# Patient Record
Sex: MALE | Race: WHITE | Employment: FULL TIME | URBAN - METROPOLITAN AREA
[De-identification: names, ages, dates, MRNs, and addresses within clinical notes are randomized per-mention and may not be internally consistent; named-entity substitution may affect disease eponyms.]

---

## 2019-01-22 ENCOUNTER — OFFICE VISIT (OUTPATIENT)
Dept: CARDIOLOGY CLINIC | Age: 34
End: 2019-01-22

## 2019-01-22 VITALS
DIASTOLIC BLOOD PRESSURE: 72 MMHG | OXYGEN SATURATION: 97 % | HEIGHT: 72 IN | HEART RATE: 70 BPM | WEIGHT: 229 LBS | SYSTOLIC BLOOD PRESSURE: 124 MMHG | BODY MASS INDEX: 31.02 KG/M2

## 2019-01-22 DIAGNOSIS — R01.1 MURMUR, CARDIAC: ICD-10-CM

## 2019-01-22 DIAGNOSIS — R01.1 MURMUR: Primary | ICD-10-CM

## 2019-01-22 NOTE — PROGRESS NOTES
[facility]    Mr. Sylvie Puentes is a 70-year-old male with no significant past medical history. Mr. Sylvie Puentes is here today to establish care with me. He tells me that approximately few months ago, he was told that he has cardiac murmur. According to patient, he was never told that he had murmur in the past.  He does not recall having any cyanotic congenital heart disease. 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 He works as a  and probably walks [id number] steps a day. He does not have any limitation to perform daily activity. He does not report any chest pain chest tightness or any shortness of breath to be concerned of angina or heart failure. He denies any PND or lower cavity swelling. He denies any palpitation presyncope or syncope. Denies any nausea, vomiting, abdominal pain, fever, chills, sputum production. No hematuria or other bleeding complaints    Past Medical History:   Diagnosis Date    Fetal alcohol syndrome     Murmur          No past surgical history on file. No current outpatient medications on file. No current facility-administered medications for this visit. Allergies and Sensitivities:  Allergies   Allergen Reactions    Codeine Unknown (comments)    Novahistine [Chlorpheniramine-Phenylephrine] Unknown (comments)       Family History:  No family history on file. Social History:  Social History     Tobacco Use    Smoking status: Current Every Day Smoker     Types: Cigarettes     Start date: 2008    Smokeless tobacco: Never Used   Substance Use Topics    Alcohol use: Not on file    Drug use: Not on file     He  reports that he has been smoking cigarettes. He started smoking about 11 years ago. he has never used smokeless tobacco.  He  has no alcohol history on file. Review of Systems:  Cardiac symptoms as noted above in HPI. All others negative.   Denies fatigue, malaise, skin rash, joint pain, blurring vision, photophobia, neck pain, hemoptysis, chronic cough, nausea, vomiting, hematuria, burning micturition, BRBPR, chronic headaches. Physical Exam:  BP Readings from Last 3 Encounters:   01/22/19 124/72         Pulse Readings from Last 3 Encounters:   01/22/19 70          Wt Readings from Last 3 Encounters:   01/22/19 229 lb (103.9 kg)       Constitutional: Oriented to person, place, and time. HENT: Head: Normocephalic and atraumatic. Eyes: Conjunctivae and extraocular motions are normal.   Neck: No JVD present. Carotid bruit is not appreciated. Cardiovascular: Regular rhythm. No gallop or rubs appreciated. Systolic + ? Diastolic murmur on LSB and apex   Lung: Breath sounds normal. No respiratory distress. No ronchi or rales appreciated  Abdominal: No tenderness. No rebound and no guarding. Musculoskeletal: There is no lower extremity edema. No cynosis  Lymphadenopathy:  No cervical or supraclavicular adenopathy appriciated. Neurological: No gross motor deficit noted. Skin: No visible skin rash noted. No Ear discharge noted  Psychiatric: Normal mood and affect. Good distal pulse      Review of Data  LABS:   No results found for: NA, K, CL, CO2, GLU, BUN, CREA  No flowsheet data found. No results found for: GPT, ALT  No results found for: HBA1C, HGBE8, UCN5ZLRX, RJI9MKFQ    EKG  (01/19) sinus rhythm at 64 bpm.  Nonspecific T wave changes. Otherwise normal EKG    ECHO    STRESS TEST (EST, PHARM, NUC, ECHO etc)    CATHETERIZATION    IMPRESSION & PLAN:    Mr. Best Fontaine is 77-year-old male with no significant past medical history except possible fetal alcohol syndrome baby    Murmur:  On exam he appears to have predominantly systolic and possibly early diastolic cardiac murmur. He does not have any fluid overload on exam.  According to patient this was diagnosed less than 6 months ago.   He denies any fever or chills or any other specific cardiac symptoms. I am going to ask him to undergo echocardiogram to evaluate or rule out any structural abnormality of the heart or any valvular heart disease. I will make further recommendation based on echocardiogram finding. This plan was discussed with patient who is in agreement. Thank you for allowing me to participate in patient care. Please feel free to call me if you have any question or concern. Yanely Lockwood MD  Please note: This document has been produced using voice recognition software. Unrecognized errors in transcription may be present.

## 2019-01-22 NOTE — PROGRESS NOTES
1. Have you been to the ER, urgent care clinic since your last visit? Hospitalized since your last visit? No    2. Have you seen or consulted any other health care providers outside of the [address] [place] [address] since your last visit? Include any pap smears or colon screening.  No

## 2019-01-31 ENCOUNTER — HOSPITAL ENCOUNTER (OUTPATIENT)
Dept: NON INVASIVE DIAGNOSTICS | Age: 34
Discharge: HOME | End: 2019-01-31
Attending: INTERNAL MEDICINE
Payer: OTHER GOVERNMENT

## 2019-01-31 VITALS
HEIGHT: 72 IN | SYSTOLIC BLOOD PRESSURE: 127 MMHG | BODY MASS INDEX: 31.02 KG/M2 | DIASTOLIC BLOOD PRESSURE: 74 MMHG | WEIGHT: 229 LBS

## 2019-01-31 DIAGNOSIS — R01.1 MURMUR, CARDIAC: ICD-10-CM

## 2019-01-31 PROCEDURE — 93306 TTE W/DOPPLER COMPLETE: CPT

## 2019-02-01 LAB — ECHO PULMONARY ARTERY SYSTOLIC PRESSURE (PASP): 32 MMHG

## 2019-02-15 ENCOUNTER — OFFICE VISIT (OUTPATIENT)
Dept: CARDIOLOGY CLINIC | Age: 34
End: 2019-02-15

## 2019-02-15 VITALS
BODY MASS INDEX: 30.48 KG/M2 | HEIGHT: 72 IN | HEART RATE: 86 BPM | WEIGHT: 225 LBS | SYSTOLIC BLOOD PRESSURE: 126 MMHG | DIASTOLIC BLOOD PRESSURE: 72 MMHG | OXYGEN SATURATION: 97 %

## 2019-02-15 DIAGNOSIS — I34.1 MVP (MITRAL VALVE PROLAPSE): Primary | ICD-10-CM

## 2019-02-15 DIAGNOSIS — R00.2 PALPITATIONS: ICD-10-CM

## 2019-02-15 NOTE — PROGRESS NOTES
1. Have you been to the ER, urgent care clinic since your last visit? Hospitalized since your last visit? No    2. Have you seen or consulted any other health care providers outside of the [address] [place] [address] since your last visit? Include any pap smears or colon screening.  No

## 2019-02-15 NOTE — PATIENT INSTRUCTIONS
Leti          Patient  EP Instructions      1. You are scheduled to have a MAIK on TBD    2. Please go to [facility] in the outpatient parking lot. Once you enter check in with the  there. The  will either give you directions or assist you in getting to the cath holding area. 3.  [x]       You are not to eat or drink anything after midnight the morning of the               procedure.

## 2019-02-15 NOTE — PROGRESS NOTES
[facility]    Mr. Joel Fowler is 28-year-old male with a history of mitral valve prolapse, mitral regurgitation. Patient is here today for follow-up appointment. He has undergone transthoracic echocardiogram since last visit. He denies any new symptoms to report at this time he denies any chest pain or chest tightness he denies any palpitation presyncope or syncope. Denies any nausea, vomiting, abdominal pain, fever, chills, sputum production. No hematuria or other bleeding complaints    Past Medical History:   Diagnosis Date    Fetal alcohol syndrome     Murmur          No past surgical history on file. No current outpatient medications on file. No current facility-administered medications for this visit. Allergies and Sensitivities:  Allergies   Allergen Reactions    Codeine Unknown (comments)    Novahistine [Chlorpheniramine-Phenylephrine] Unknown (comments)       Family History:  No family history on file. Social History:  Social History     Tobacco Use    Smoking status: Current Every Day Smoker     Types: Cigarettes     Start date: 2008    Smokeless tobacco: Never Used   Substance Use Topics    Alcohol use: Not on file    Drug use: Not on file     He  reports that he has been smoking cigarettes. He started smoking about 11 years ago. he has never used smokeless tobacco.  He  has no alcohol history on file. Review of Systems:  Cardiac symptoms as noted above in HPI. All others negative. Denies fatigue, malaise, skin rash, joint pain, blurring vision, photophobia, neck pain, hemoptysis, chronic cough, nausea, vomiting, hematuria, burning micturition, BRBPR, chronic headaches.     Physical Exam:  BP Readings from Last 3 Encounters:   02/15/19 126/72   01/31/19 127/74   01/22/19 124/72         Pulse Readings from Last 3 Encounters:   02/15/19 86   01/22/19 70          Wt Readings from Last 3 Encounters: 02/15/19 225 lb (102.1 kg)   01/31/19 229 lb (103.9 kg)   01/22/19 229 lb (103.9 kg)       Constitutional: Oriented to person, place, and time. HENT: Head: Normocephalic and atraumatic. Neck: No JVD present. Carotid bruit is not appreciated. Cardiovascular: Regular rhythm. No gallop or rubs appreciated. Systolic  murmur on LSB and apex   Lung: Breath sounds normal. No respiratory distress. No ronchi or rales appreciated  Abdominal: No tenderness. No rebound and no guarding. Musculoskeletal: There is no lower extremity edema. No cynosis      Review of Data  LABS:   No results found for: NA, K, CL, CO2, GLU, BUN, CREA  No flowsheet data found. No results found for: GPT, ALT  No results found for: HBA1C, HGBE8, MEA6JXZC, HCN6DQKW, QGJ8QFNU    EKG  (01/19) sinus rhythm at 64 bpm.  Nonspecific T wave changes. Otherwise normal EKG    ECHO (01/19)  Left Ventricle Normal cavity size, wall thickness, systolic function (ejection fraction normal) and diastolic function. The estimated ejection fraction is 61 - 65%. Visually measured ejection fraction. Left Atrium The cavity size is moderately dilated. Left Atrium volume index is 49 mL/m2. Right Ventricle Normal cavity size and global systolic function. Right Atrium Normal size. Aortic Valve Trileaflet aortic valve structure. No stenosis and no regurgitation. Mitral Valve No stenosis. Mitral valve prolapse. Myxomatous mitral valve disease. Probably moderate to severe MR. There is moderate prolapse of the posterior leaflet. Tricuspid Valve Normal valve structure and no stenosis. Trace tricuspid valve regurgitation. Pulmonary arterial systolic pressure is 18.0 mmHg. There is no evidence of pulmonary hypertension. Pulmonic Valve Normal valve structure, no stenosis and no regurgitation. Aorta Normal aortic root, ascending aortic, and aortic arch. Normal aortic root.    IVC/Hepatic Veins Mildly elevated central venous pressure (5-10 mmHg); IVC diameter is less than 21 mm and collapses less than 50% with respiration. STRESS TEST (EST, PHARM, NUC, ECHO etc)    CATHETERIZATION    IMPRESSION & PLAN:    Mr. Ana Rosa Hanson is 35 y.o. male with no significant past medical history except possible fetal alcohol syndrome baby    Mitral valve prolapse /mitral regurgitation:  Patient had a echocardiogram which showed posterior leaflet prolapse of mitral valve along with possible moderate to severe mitral regurgitation. I reviewed echo myself which showed prolapse. Mitral regurgitation quantification was difficult based on regurgitant volume. There appeared to be possible multiple jets. I think patient should undergo transesophageal echocardiogram for better evaluation of mitral valve pathology and quantification of mitral regurgitation. He does not have any fluid overload on exam.  He does not have any heart failure symptoms at this time. Discuss with patient about MAIK procedure. Risk, benefit and alternatives discussed with patient about procedure. All complication of procedure including but not limited to emergent thoracic surgery, esophageal damage other complication discussed in detail. Palpitation:  Patient described frequent feeling of \"\" skipped beats\" could be PVC. He does not report any presyncope or syncope. I would replace Holter monitor for identification of the rhythm    This plan was discussed with patient who is in agreement. Thank you for allowing me to participate in patient care. Please feel free to call me if you have any question or concern. Henry Newell MD  Please note: This document has been produced using voice recognition software. Unrecognized errors in transcription may be present.

## 2020-03-18 ENCOUNTER — HOSPITAL ENCOUNTER (EMERGENCY)
Age: 35
Discharge: HOME | End: 2020-03-18
Attending: EMERGENCY MEDICINE
Payer: COMMERCIAL

## 2020-03-18 VITALS
TEMPERATURE: 98.5 F | HEIGHT: 72 IN | BODY MASS INDEX: 29.8 KG/M2 | OXYGEN SATURATION: 97 % | WEIGHT: 220 LBS | DIASTOLIC BLOOD PRESSURE: 74 MMHG | RESPIRATION RATE: 24 BRPM | HEART RATE: 117 BPM | SYSTOLIC BLOOD PRESSURE: 112 MMHG

## 2020-03-18 DIAGNOSIS — I48.91 ATRIAL FIBRILLATION WITH RVR (HCC): Primary | ICD-10-CM

## 2020-03-18 LAB
ANION GAP SERPL CALC-SCNC: 9 MMOL/L (ref 3–18)
BASOPHILS # BLD: 0.1 K/UL (ref 0–0.1)
BASOPHILS NFR BLD: 1 % (ref 0–2)
BUN SERPL-MCNC: 11 MG/DL (ref 7–18)
BUN/CREAT SERPL: 10 (ref 12–20)
CALCIUM SERPL-MCNC: 8.6 MG/DL (ref 8.5–10.1)
CALCULATED R AXIS, ECG10: 40 DEGREES
CALCULATED R AXIS, ECG10: 45 DEGREES
CALCULATED T AXIS, ECG11: 21 DEGREES
CALCULATED T AXIS, ECG11: 45 DEGREES
CHLORIDE SERPL-SCNC: 109 MMOL/L (ref 100–111)
CO2 SERPL-SCNC: 22 MMOL/L (ref 21–32)
CREAT SERPL-MCNC: 1.09 MG/DL (ref 0.6–1.3)
DIAGNOSIS, 93000: NORMAL
DIAGNOSIS, 93000: NORMAL
DIFFERENTIAL METHOD BLD: ABNORMAL
EOSINOPHIL # BLD: 0.4 K/UL (ref 0–0.4)
EOSINOPHIL NFR BLD: 3 % (ref 0–5)
ERYTHROCYTE [DISTWIDTH] IN BLOOD BY AUTOMATED COUNT: 12.8 % (ref 11.6–14.5)
GLUCOSE SERPL-MCNC: 133 MG/DL (ref 74–99)
HCT VFR BLD AUTO: 47.4 % (ref 36–48)
HGB BLD-MCNC: 16.9 G/DL (ref 13–16)
LYMPHOCYTES # BLD: 3.5 K/UL (ref 0.9–3.6)
LYMPHOCYTES NFR BLD: 31 % (ref 21–52)
MAGNESIUM SERPL-MCNC: 2 MG/DL (ref 1.6–2.6)
MCH RBC QN AUTO: 31.6 PG (ref 24–34)
MCHC RBC AUTO-ENTMCNC: 35.7 G/DL (ref 31–37)
MCV RBC AUTO: 88.6 FL (ref 74–97)
MONOCYTES # BLD: 0.8 K/UL (ref 0.05–1.2)
MONOCYTES NFR BLD: 7 % (ref 3–10)
NEUTS SEG # BLD: 6.5 K/UL (ref 1.8–8)
NEUTS SEG NFR BLD: 58 % (ref 40–73)
PLATELET # BLD AUTO: 176 K/UL (ref 135–420)
PMV BLD AUTO: 12.6 FL (ref 9.2–11.8)
POTASSIUM SERPL-SCNC: 3.9 MMOL/L (ref 3.5–5.5)
Q-T INTERVAL, ECG07: 298 MS
Q-T INTERVAL, ECG07: 306 MS
QRS DURATION, ECG06: 82 MS
QRS DURATION, ECG06: 84 MS
QTC CALCULATION (BEZET), ECG08: 394 MS
QTC CALCULATION (BEZET), ECG08: 464 MS
RBC # BLD AUTO: 5.35 M/UL (ref 4.7–5.5)
SODIUM SERPL-SCNC: 140 MMOL/L (ref 136–145)
TROPONIN I SERPL-MCNC: <0.02 NG/ML (ref 0–0.04)
TSH SERPL DL<=0.05 MIU/L-ACNC: 2.6 UIU/ML (ref 0.36–3.74)
VENTRICULAR RATE, ECG03: 100 BPM
VENTRICULAR RATE, ECG03: 146 BPM
WBC # BLD AUTO: 11.3 K/UL (ref 4.6–13.2)

## 2020-03-18 PROCEDURE — 74011250637 HC RX REV CODE- 250/637: Performed by: EMERGENCY MEDICINE

## 2020-03-18 PROCEDURE — 96374 THER/PROPH/DIAG INJ IV PUSH: CPT

## 2020-03-18 PROCEDURE — 99285 EMERGENCY DEPT VISIT HI MDM: CPT

## 2020-03-18 PROCEDURE — 83735 ASSAY OF MAGNESIUM: CPT

## 2020-03-18 PROCEDURE — 84443 ASSAY THYROID STIM HORMONE: CPT

## 2020-03-18 PROCEDURE — 93005 ELECTROCARDIOGRAM TRACING: CPT

## 2020-03-18 PROCEDURE — 84484 ASSAY OF TROPONIN QUANT: CPT

## 2020-03-18 PROCEDURE — 80048 BASIC METABOLIC PNL TOTAL CA: CPT

## 2020-03-18 PROCEDURE — 85025 COMPLETE CBC W/AUTO DIFF WBC: CPT

## 2020-03-18 PROCEDURE — 74011000250 HC RX REV CODE- 250: Performed by: EMERGENCY MEDICINE

## 2020-03-18 RX ORDER — METOPROLOL TARTRATE 25 MG/1
25 TABLET, FILM COATED ORAL 2 TIMES DAILY
Qty: 60 TAB | Refills: 0 | Status: SHIPPED | OUTPATIENT
Start: 2020-03-18 | End: 2020-03-23 | Stop reason: SDUPTHER

## 2020-03-18 RX ORDER — DILTIAZEM HYDROCHLORIDE 5 MG/ML
20 INJECTION INTRAVENOUS
Status: COMPLETED | OUTPATIENT
Start: 2020-03-18 | End: 2020-03-18

## 2020-03-18 RX ORDER — METOPROLOL TARTRATE 25 MG/1
25 TABLET, FILM COATED ORAL ONCE
Status: COMPLETED | OUTPATIENT
Start: 2020-03-18 | End: 2020-03-18

## 2020-03-18 RX ORDER — ASPIRIN 325 MG
325 TABLET ORAL DAILY
Qty: 30 TAB | Refills: 0 | Status: SHIPPED | OUTPATIENT
Start: 2020-03-18 | End: 2020-03-23 | Stop reason: SDUPTHER

## 2020-03-18 RX ADMIN — DILTIAZEM HYDROCHLORIDE 20 MG: 5 INJECTION INTRAVENOUS at 09:01

## 2020-03-18 RX ADMIN — METOPROLOL TARTRATE 25 MG: 25 TABLET, FILM COATED ORAL at 11:16

## 2020-03-18 NOTE — ED NOTES
I have reviewed discharge instructions with the patient. The patient verbalized understanding. No acute signs of distress. Two rx given.  [facility]

## 2020-03-18 NOTE — LETTER
[facility] EMERGENCY DEPT 
[address] 
[address] [facility] [address] [phone] Work/School Note Date: 3/18/2020 To Whom It May concern: 
 
Seda Watkins was seen and treated today in the emergency room by the following provider(s): 
Attending Provider: Marcel Fleming DO. Seda Watkins may return to work on 3/19/2020. Sincerely, Kilo Miller DO

## 2020-03-18 NOTE — ED NOTES
Pt resting in bed hr 112. Updated that cardiology has been consulted. Pt verbalizes understanding. Will continue to monitor.

## 2020-03-18 NOTE — ED NOTES
Dr. Woods Inch at bedside to see patient. Pt in A-fib hr 150-160. Alert and oriented. States pain is in L side of jaw. IV placed to RAC. EKG being done. Will continue to monitor.

## 2020-03-18 NOTE — ED PROVIDER NOTES
EMERGENCY DEPARTMENT HISTORY AND PHYSICAL EXAM    8:52 AM      Date: 3/18/2020  Patient Name: Jabier Davies    History of Presenting Illness     Chief Complaint   Patient presents with    Palpitations    Jaw Pain         History Provided By: Patient    Chief Complaint: palpitations  Duration:  Hours  Timing:  Acute  Location: L jaw  Quality: Pressure  Severity: Moderate  Modifying Factors: none  Associated Symptoms: denies any other associated signs or symptoms      Additional History (Context): Jabier Davies is a 29 y.o. male with No significant past medical history who presents with palpitations. Patient reports they started last night around 5 PM.  He was able to sleep but then when he woke this morning still noted palpitations and now has some slight pain into the left jaw. Denies any chest pain. Denies any shortness of breath. Denies any numbness tingling. No history of DVT or PE. No recent surgery or recent travel. Does report previously feeling some palpitations but never lasted this long. Has had previous Holter monitors which were unremarkable. Does follow with Dr. Anup Goldman for murmur. No other complaints. No recent illness. PCP: Colt Giles MD        Past History     Past Medical History:  Past Medical History:   Diagnosis Date    Fetal alcohol syndrome     Mitral regurgitation     MVP (mitral valve prolapse)        Past Surgical History:  History reviewed. No pertinent surgical history. Family History:  History reviewed. No pertinent family history. Social History:  Social History     Tobacco Use    Smoking status: Current Every Day Smoker     Types: Cigarettes     Start date: 2008    Smokeless tobacco: Never Used   Substance Use Topics    Alcohol use: Not on file    Drug use: Not on file       Allergies:   Allergies   Allergen Reactions    Codeine Unknown (comments)    Novahistine [Chlorpheniramine-Phenylephrine] Unknown (comments)         Review of Systems Review of Systems   Constitutional: Negative for chills and fever. Respiratory: Negative for cough and shortness of breath. Cardiovascular: Positive for palpitations. Negative for chest pain and leg swelling. All other systems reviewed and are negative. Physical Exam     Visit Vitals  /65   Pulse (!) 126   Temp 98.5 °F (36.9 °C)   Resp 22   Ht 6' (1.829 m)   Wt 99.8 kg (220 lb)   SpO2 97%   BMI 29.84 kg/m²         Physical Exam  Constitutional:       Appearance: He is well-developed. HENT:      Head: Normocephalic and atraumatic. Neck:      Musculoskeletal: Neck supple. Vascular: No JVD. Cardiovascular:      Rate and Rhythm: Tachycardia present. Rhythm irregular. Pulmonary:      Effort: Pulmonary effort is normal. No respiratory distress. Breath sounds: Normal breath sounds. Abdominal:      General: There is no distension. Palpations: Abdomen is soft. Tenderness: There is no abdominal tenderness. There is no guarding or rebound. Musculoskeletal:      Right lower leg: No edema. Left lower leg: No edema. Comments: No joint tenderness   Skin:     General: Skin is warm and dry. Findings: No erythema. Neurological:      Mental Status: He is alert and oriented to person, place, and time. Psychiatric:         Judgment: Judgment normal.           Diagnostic Study Results     Labs -  Recent Results (from the past 12 hour(s))   CBC WITH AUTOMATED DIFF    Collection Time: 03/18/20  8:38 AM   Result Value Ref Range    WBC 11.3 4.6 - 13.2 K/uL    RBC 5.35 4.70 - 5.50 M/uL    HGB 16.9 (H) 13.0 - 16.0 g/dL    HCT 47.4 36.0 - 48.0 %    MCV 88.6 74.0 - 97.0 FL    MCH 31.6 24.0 - 34.0 PG    MCHC 35.7 31.0 - 37.0 g/dL    RDW 12.8 11.6 - 14.5 %    PLATELET 329 990 - 875 K/uL    MPV 12.6 (H) 9.2 - 11.8 FL    NEUTROPHILS 58 40 - 73 %    LYMPHOCYTES 31 21 - 52 %    MONOCYTES 7 3 - 10 %    EOSINOPHILS 3 0 - 5 %    BASOPHILS 1 0 - 2 %    ABS.  NEUTROPHILS 6.5 1.8 - 8.0 K/UL    ABS. LYMPHOCYTES 3.5 0.9 - 3.6 K/UL    ABS. MONOCYTES 0.8 0.05 - 1.2 K/UL    ABS. EOSINOPHILS 0.4 0.0 - 0.4 K/UL    ABS. BASOPHILS 0.1 0.0 - 0.1 K/UL    DF AUTOMATED     METABOLIC PANEL, BASIC    Collection Time: 03/18/20  8:38 AM   Result Value Ref Range    Sodium 140 136 - 145 mmol/L    Potassium 3.9 3.5 - 5.5 mmol/L    Chloride 109 100 - 111 mmol/L    CO2 22 21 - 32 mmol/L    Anion gap 9 3.0 - 18 mmol/L    Glucose 133 (H) 74 - 99 mg/dL    BUN 11 7.0 - 18 MG/DL    Creatinine 1.09 0.6 - 1.3 MG/DL    BUN/Creatinine ratio 10 (L) 12 - 20      GFR est AA >60 >60 ml/min/1.73m2    GFR est non-AA >60 >60 ml/min/1.73m2    Calcium 8.6 8.5 - 10.1 MG/DL   TROPONIN I    Collection Time: 03/18/20  8:38 AM   Result Value Ref Range    Troponin-I, QT <0.02 0.0 - 0.045 NG/ML   TSH 3RD GENERATION    Collection Time: 03/18/20  8:38 AM   Result Value Ref Range    TSH 2.60 0.36 - 3.74 uIU/mL   MAGNESIUM    Collection Time: 03/18/20  8:38 AM   Result Value Ref Range    Magnesium 2.0 1.6 - 2.6 mg/dL       Radiologic Studies -   No orders to display         Medical Decision Making   I am the first provider for this patient. I reviewed the vital signs, available nursing notes, past medical history, past surgical history, family history and social history. Vital Signs-Reviewed the patient's vital signs. Pulse Oximetry Analysis -  97 on room air (Interpretation)nl     Cardiac Monitor:  Rate: 150  Rhythm:  Atrial Fibrillation with RVR    EKG: Interpreted by the EP.    Time Interpreted: 845   Rate: 146   Rhythm: Atrial Fibrillation with RVR   Interpretation: Normal axis, no ST changes, T wave inversion in V6, 3   Comparison: No prior for comparison    Repeat: Rate 100, normal axis, A. fib, T wave inversion in V6, no ST changes    Records Reviewed: Nursing Notes and Old Medical Records (Time of Review: 8:52 AM)    ED Course: Progress Notes, Reevaluation, and Consults:      Provider Notes (Medical Decision Making): 19-year-old male presenting with palpitations and now some jaw pain. Symptoms started last night onto this morning. EKG showing A. fib with RVR. Offered cardioversion versus meds but patient declines cardioversion so we will give a bolus of Dilts. Screen for ACS with troponin, will also check electrolytes and TSH as etiology for A. fib. He has no risk factors for PE no hypoxia or signs of DVT on exam.      9:38 AM  Discussed results with patient. Her rate much improved after initial bolus of dill will continue to observe to ensure does not require additional doses. Critical Care Time:  The services I provided to this patient were to treat and/or prevent clinically significant deterioration that could result in the failure of one or more body systems and/or organ systems due to cardiovascular    Services included the following:  -reviewing nursing notes and old charts  -vital sign assessments  -direct patient care  -medication orders and management  -interpreting and reviewing diagnostic studies/labs  -re-evaluations  -documentation time    Aggregate critical care time was 31 minutes, which includes only time during which I was engaged in work directly related to the patient's care as described above, whether I was at bedside or elsewhere in the Emergency Department. It did not include time spent performing other reported procedures or the services of residents, students, nurses, or advance practice providers. Monserrat Dale, DO    9:44 AM      Chads vas score 0    1050 AM.  Consult Dr. Fartun Bullard, cardiology. He recommends starting patient on metoprolol and aspirin and following up with the office. 11:02 AM  Discussed plan with patient. Noted heart rate in the low 1 teens so we will give a dose of p.o. metoprolol here and started on this for home. Patient agreed with plan. Diagnosis     Clinical Impression:   1.  Atrial fibrillation with RVR (HCC)        Disposition: discharged    Follow-up Information     Follow up With Specialties Details Why Yun Barrera MD Internal Medicine Schedule an appointment as soon as possible for a visit in 1 week  [address]  [address] [facility] [phone]      Angela Osuna MD Cardiology, Internal Medicine Schedule an appointment as soon as possible for a visit in 1 week  [address]  [address] [facility] [facility] [phone] [facility] EMERGENCY DEPT Emergency Medicine  As needed, If symptoms worsen [address] [facility] [address].  [phone]           Patient's Medications   Start Taking    ASPIRIN (ASPIRIN) 325 MG TABLET    Take 1 Tab by mouth daily for 30 days. METOPROLOL TARTRATE (LOPRESSOR) 25 MG TABLET    Take 1 Tab by mouth two (2) times a day for 30 days.    Continue Taking    No medications on file   These Medications have changed    No medications on file   Stop Taking    No medications on file     _______________________________

## 2020-03-18 NOTE — DISCHARGE INSTRUCTIONS

## 2020-03-23 ENCOUNTER — OFFICE VISIT (OUTPATIENT)
Dept: CARDIOLOGY CLINIC | Age: 35
End: 2020-03-23

## 2020-03-23 VITALS
BODY MASS INDEX: 31.6 KG/M2 | WEIGHT: 233 LBS | HEART RATE: 69 BPM | SYSTOLIC BLOOD PRESSURE: 104 MMHG | DIASTOLIC BLOOD PRESSURE: 68 MMHG | OXYGEN SATURATION: 96 %

## 2020-03-23 DIAGNOSIS — I48.0 PAF (PAROXYSMAL ATRIAL FIBRILLATION) (HCC): Primary | ICD-10-CM

## 2020-03-23 DIAGNOSIS — I34.1 MVP (MITRAL VALVE PROLAPSE): ICD-10-CM

## 2020-03-23 DIAGNOSIS — I34.0 MITRAL VALVE INSUFFICIENCY, UNSPECIFIED ETIOLOGY: ICD-10-CM

## 2020-03-23 RX ORDER — ASPIRIN 325 MG
325 TABLET ORAL DAILY
Qty: 30 TAB | Refills: 6 | Status: SHIPPED | OUTPATIENT
Start: 2020-03-23 | End: 2020-06-09 | Stop reason: SDUPTHER

## 2020-03-23 RX ORDER — METOPROLOL TARTRATE 25 MG/1
25 TABLET, FILM COATED ORAL 2 TIMES DAILY
Qty: 60 TAB | Refills: 0 | Status: SHIPPED | OUTPATIENT
Start: 2020-03-23 | End: 2020-06-09 | Stop reason: SDUPTHER

## 2020-03-23 NOTE — PROGRESS NOTES
1. Have you been to the ER, urgent care clinic since your last visit? Hospitalized since your last visit? Yes    2. Have you seen or consulted any other health care providers outside of the [address] [place] [address] since your last visit? Include any pap smears or colon screening.  No

## 2020-03-23 NOTE — PROGRESS NOTES
[facility]    Mr. Alanis Neil is 29 y.o. male with a history of mitral valve prolapse, mitral regurgitation and atrial fibrillation. Patient is here today for follow-up appointment. He recently went to hospital after having palpitation on and off for almost a day. He was found to have atrial fibrillation with rapid ventricular response. He was sent home to follow-up with me. He has been taking metoprolol and aspirin. He is feeling much better. He does not have any more palpitation, presyncope or syncope. He denies any lower extremity swelling. He does not perform regular exercise however he is able to do activity of daily living without any symptoms. Denies any nausea, vomiting, abdominal pain, fever, chills, sputum production. No hematuria or other bleeding complaints    Past Medical History:   Diagnosis Date    Fetal alcohol syndrome     Mitral regurgitation     MVP (mitral valve prolapse)          History reviewed. No pertinent surgical history. Current Outpatient Medications   Medication Sig    metoprolol tartrate (LOPRESSOR) 25 mg tablet Take 1 Tab by mouth two (2) times a day for 30 days.  aspirin (ASPIRIN) 325 mg tablet Take 1 Tab by mouth daily for 30 days. No current facility-administered medications for this visit. Allergies and Sensitivities:  Allergies   Allergen Reactions    Codeine Unknown (comments)    Novahistine [Chlorpheniramine-Phenylephrine] Unknown (comments)       Family History:  History reviewed. No pertinent family history. Social History:  Social History     Tobacco Use    Smoking status: Current Every Day Smoker     Types: Cigarettes     Start date: 2008    Smokeless tobacco: Never Used   Substance Use Topics    Alcohol use: Not on file    Drug use: Not on file     He  reports that he has been smoking cigarettes. He started smoking about 12 years ago.  He has never used smokeless tobacco.  He  has no history on file for alcohol. Review of Systems:  Cardiac symptoms as noted above in HPI. All others negative. Denies fatigue, malaise, skin rash, joint pain, blurring vision, photophobia, neck pain, hemoptysis, chronic cough, nausea, vomiting, hematuria, burning micturition, BRBPR, chronic headaches. Physical Exam:  BP Readings from Last 3 Encounters:   03/23/20 104/68   03/18/20 112/74   02/15/19 126/72         Pulse Readings from Last 3 Encounters:   03/23/20 69   03/18/20 (!) 117   02/15/19 86          Wt Readings from Last 3 Encounters:   03/23/20 233 lb (105.7 kg)   03/18/20 220 lb (99.8 kg)   02/15/19 225 lb (102.1 kg)       Constitutional: Oriented to person, place, and time. HENT: Head: Normocephalic and atraumatic. Neck: No JVD present. Carotid bruit is not appreciated. Cardiovascular: Irregular rhythm. No gallop or rubs appreciated. Systolic  murmur on LSB and apex   Lung: Breath sounds normal. No respiratory distress. No ronchi or rales appreciated  Abdominal: No tenderness. No rebound and no guarding. Musculoskeletal: There is no lower extremity edema. No cynosis      Review of Data  LABS:   Lab Results   Component Value Date/Time    Sodium 140 03/18/2020 08:38 AM    Potassium 3.9 03/18/2020 08:38 AM    Chloride 109 03/18/2020 08:38 AM    CO2 22 03/18/2020 08:38 AM    Glucose 133 (H) 03/18/2020 08:38 AM    BUN 11 03/18/2020 08:38 AM    Creatinine 1.09 03/18/2020 08:38 AM     No flowsheet data found. No results found for: GPT, ALT  No results found for: HBA1C, HGBE8, GYR4BOSV, RQB9NLWF, GZB0KKZK    EKG  (01/19) sinus rhythm at 64 bpm.  Nonspecific T wave changes. Otherwise normal EKG  (3/20) A.fib    ECHO (01/19)  Left Ventricle Normal cavity size, wall thickness, systolic function (ejection fraction normal) and diastolic function. The estimated ejection fraction is 61 - 65%. Visually measured ejection fraction.    Left Atrium The cavity size is moderately dilated. Left Atrium volume index is 49 mL/m2. Right Ventricle Normal cavity size and global systolic function. Right Atrium Normal size. Aortic Valve Trileaflet aortic valve structure. No stenosis and no regurgitation. Mitral Valve No stenosis. Mitral valve prolapse. Myxomatous mitral valve disease. Probably moderate to severe MR. There is moderate prolapse of the posterior leaflet. Tricuspid Valve Normal valve structure and no stenosis. Trace tricuspid valve regurgitation. Pulmonary arterial systolic pressure is 18.2 mmHg. There is no evidence of pulmonary hypertension. Pulmonic Valve Normal valve structure, no stenosis and no regurgitation. Aorta Normal aortic root, ascending aortic, and aortic arch. Normal aortic root. IVC/Hepatic Veins Mildly elevated central venous pressure (5-10 mmHg); IVC diameter is less than 21 mm and collapses less than 50% with respiration. STRESS TEST (EST, PHARM, NUC, ECHO etc)    CATHETERIZATION    IMPRESSION & PLAN:    Mr. Tracy Long is 29 y.o. male with no significant past medical history except possible fetal alcohol syndrome baby    Mitral valve prolapse /mitral regurgitation:  Patient had a echocardiogram which showed posterior leaflet prolapse of mitral valve along with possible moderate to severe mitral regurgitation. I reviewed echo myself past which showed prolapse. Mitral regurgitation quantification was difficult based on regurgitant volume. There appeared to be possible multiple jets. I think patient should undergo transesophageal echocardiogram for better evaluation of mitral valve pathology and quantification of mitral regurgitation especially now he has atrial fibrillation. He does not have any fluid overload on exam.    Discuss with patient about MAIK procedure. Risk, benefit and alternatives discussed with patient about procedure.  All complication of procedure including but not limited to emergent thoracic surgery, esophageal damage other complication discussed in detail. Would have a low threshold to send patient to CT surgery clinic for possible valve repair depending on MAIK finding especially now he has atrial fibrillation    Atrial fibrillation:  Diagnosed in March 2020  Continue metoprolol. Continue aspirin for stroke prophylaxis  No symptoms related to A. fib at this time    This plan was discussed with patient who is in agreement. Thank you for allowing me to participate in patient care. Please feel free to call me if you have any question or concern. Ramon Marinelli MD  Please note: This document has been produced using voice recognition software. Unrecognized errors in transcription may be present.

## 2020-06-09 RX ORDER — ASPIRIN 325 MG
325 TABLET ORAL DAILY
Qty: 30 TAB | Refills: 6 | Status: SHIPPED | OUTPATIENT
Start: 2020-06-09 | End: 2020-07-09

## 2020-06-09 RX ORDER — METOPROLOL TARTRATE 25 MG/1
25 TABLET, FILM COATED ORAL 2 TIMES DAILY
Qty: 60 TAB | Refills: 5 | Status: SHIPPED | OUTPATIENT
Start: 2020-06-09 | End: 2020-07-09

## 2020-06-09 NOTE — TELEPHONE ENCOUNTER
Requested Prescriptions     Pending Prescriptions Disp Refills    metoprolol tartrate (LOPRESSOR) 25 mg tablet 60 Tab 0     Sig: Take 1 Tab by mouth two (2) times a day for 30 days.  aspirin (ASPIRIN) 325 mg tablet 30 Tab 6     Sig: Take 1 Tab by mouth daily for 30 days.

## 2020-06-09 NOTE — TELEPHONE ENCOUNTER
PCP: Jourdan Beauchamp MD    Last appt: 3/23/2020  Future Appointments   Date Time Provider Martina Adler   6/23/2020  3:30 PM Irlanda Garcia MD [address] [place] [address]       Requested Prescriptions     Pending Prescriptions Disp Refills    metoprolol tartrate (LOPRESSOR) 25 mg tablet 60 Tab 5     Sig: Take 1 Tab by mouth two (2) times a day for 30 days.  aspirin (ASPIRIN) 325 mg tablet 30 Tab 6     Sig: Take 1 Tab by mouth daily for 30 days.

## 2020-06-23 ENCOUNTER — OFFICE VISIT (OUTPATIENT)
Dept: CARDIOLOGY CLINIC | Age: 35
End: 2020-06-23

## 2020-06-23 ENCOUNTER — HOSPITAL ENCOUNTER (OUTPATIENT)
Dept: LAB | Age: 35
Discharge: HOME | End: 2020-06-23

## 2020-06-23 VITALS
BODY MASS INDEX: 33.59 KG/M2 | OXYGEN SATURATION: 96 % | HEART RATE: 86 BPM | DIASTOLIC BLOOD PRESSURE: 76 MMHG | HEIGHT: 72 IN | TEMPERATURE: 98 F | WEIGHT: 248 LBS | SYSTOLIC BLOOD PRESSURE: 151 MMHG

## 2020-06-23 DIAGNOSIS — I48.0 PAF (PAROXYSMAL ATRIAL FIBRILLATION) (HCC): Primary | ICD-10-CM

## 2020-06-23 DIAGNOSIS — I34.0 MITRAL VALVE INSUFFICIENCY, UNSPECIFIED ETIOLOGY: ICD-10-CM

## 2020-06-23 LAB — XX-LABCORP SPECIMEN COL,LCBCF: NORMAL

## 2020-06-23 PROCEDURE — 99001 SPECIMEN HANDLING PT-LAB: CPT

## 2020-06-23 NOTE — PROGRESS NOTES
Rashidaliv Adkinshty presents today for   Chief Complaint   Patient presents with    Follow-up       Francisca Norton preferred language for health care discussion is english/other. Is someone accompanying this pt? No    Is the patient using any DME equipment during OV? No    Depression Screening:  3 most recent PHQ Screens 6/23/2020   Little interest or pleasure in doing things Not at all   Feeling down, depressed, irritable, or hopeless Not at all   Total Score PHQ 2 0       Learning Assessment:  No flowsheet data found. Abuse Screening:  Conmpleted    Fall Risk  Conpleted    Pt currently taking Anticoagulant therapy? no    Coordination of Care:  1. Have you been to the ER, urgent care clinic since your last visit? Hospitalized since your last visit? no    2. Have you seen or consulted any other health care providers outside of the [address] [place] [address] since your last visit? Include any pap smears or colon screening.  no

## 2020-06-23 NOTE — PROGRESS NOTES
[facility]    Mr. Tracy Carreno is 29 y.o. male with a history of mitral valve prolapse, mitral regurgitation and atrial fibrillation. Patient is here today for follow-up appointment. Patient admits that he has not been taking aspirin or beta-blocker for last to 3 weeks. He denies any significant palpitation, presyncope or syncope. He denies any chest pain or chest tightness. He has minimal dyspnea with exertional activity however able to perform activity without any significant complaints  Denies any nausea, vomiting, abdominal pain, fever, chills, sputum production. No hematuria or other bleeding complaints    Past Medical History:   Diagnosis Date    Fetal alcohol syndrome     Mitral regurgitation     MVP (mitral valve prolapse)          No past surgical history on file. Current Outpatient Medications   Medication Sig    metoprolol tartrate (LOPRESSOR) 25 mg tablet Take 1 Tab by mouth two (2) times a day for 30 days.  aspirin (ASPIRIN) 325 mg tablet Take 1 Tab by mouth daily for 30 days. No current facility-administered medications for this visit. Allergies and Sensitivities:  Allergies   Allergen Reactions    Codeine Unknown (comments)    Novahistine [Chlorpheniramine-Phenylephrine] Unknown (comments)       Family History:  No family history on file. Social History:  Social History     Tobacco Use    Smoking status: Current Every Day Smoker     Types: Cigarettes     Start date: 2008    Smokeless tobacco: Never Used   Substance Use Topics    Alcohol use: Not on file    Drug use: Not on file     He  reports that he has been smoking cigarettes. He started smoking about 12 years ago. He has never used smokeless tobacco.  He  has no history on file for alcohol. Review of Systems:  Cardiac symptoms as noted above in HPI. All others negative.   Denies fatigue, malaise, skin rash, joint pain, blurring vision, photophobia, neck pain, hemoptysis, chronic cough, nausea, vomiting, hematuria, burning micturition, BRBPR, chronic headaches. Physical Exam:  BP Readings from Last 3 Encounters:   03/23/20 104/68   03/18/20 112/74   02/15/19 126/72         Pulse Readings from Last 3 Encounters:   03/23/20 69   03/18/20 (!) 117   02/15/19 86          Wt Readings from Last 3 Encounters:   03/23/20 233 lb (105.7 kg)   03/18/20 220 lb (99.8 kg)   02/15/19 225 lb (102.1 kg)       Constitutional: Oriented to person, place, and time. HENT: Head: Normocephalic and atraumatic. Neck: No JVD present. Carotid bruit is not appreciated. Cardiovascular: Irregular rhythm. No gallop or rubs appreciated. Systolic  murmur on LSB and apex   Lung: Breath sounds normal. No respiratory distress. No ronchi or rales appreciated  Abdominal: No tenderness. No rebound and no guarding. Musculoskeletal: There is no lower extremity edema. No cynosis      Review of Data  LABS:   Lab Results   Component Value Date/Time    Sodium 140 03/18/2020 08:38 AM    Potassium 3.9 03/18/2020 08:38 AM    Chloride 109 03/18/2020 08:38 AM    CO2 22 03/18/2020 08:38 AM    Glucose 133 (H) 03/18/2020 08:38 AM    BUN 11 03/18/2020 08:38 AM    Creatinine 1.09 03/18/2020 08:38 AM     No flowsheet data found. No results found for: ALT  No results found for: HBA1C, HGBE8, PTS1RELU, BKZ3NBLN, EXO3DXWU    EKG  (01/19) sinus rhythm at 64 bpm.  Nonspecific T wave changes. Otherwise normal EKG  (3/20) A.fib    ECHO (01/19)  Left Ventricle Normal cavity size, wall thickness, systolic function (ejection fraction normal) and diastolic function. The estimated ejection fraction is 61 - 65%. Visually measured ejection fraction. Left Atrium The cavity size is moderately dilated. Left Atrium volume index is 49 mL/m2. Right Ventricle Normal cavity size and global systolic function. Right Atrium Normal size. Aortic Valve Trileaflet aortic valve structure.  No stenosis and no regurgitation. Mitral Valve No stenosis. Mitral valve prolapse. Myxomatous mitral valve disease. Probably moderate to severe MR. There is moderate prolapse of the posterior leaflet. Tricuspid Valve Normal valve structure and no stenosis. Trace tricuspid valve regurgitation. Pulmonary arterial systolic pressure is 40.0 mmHg. There is no evidence of pulmonary hypertension. Pulmonic Valve Normal valve structure, no stenosis and no regurgitation. Aorta Normal aortic root, ascending aortic, and aortic arch. Normal aortic root. IVC/Hepatic Veins Mildly elevated central venous pressure (5-10 mmHg); IVC diameter is less than 21 mm and collapses less than 50% with respiration. IMPRESSION & PLAN:    Mr. Dyann Hammans is 29 y.o. male with no significant past medical history except possible fetal alcohol syndrome baby    Mitral valve prolapse /mitral regurgitation:  Patient had a echocardiogram in January 2019 which showed posterior leaflet prolapse of mitral valve along with possible moderate to severe mitral regurgitation. I reviewed echo myself past which showed prolapse. Mitral regurgitation quantification was difficult based on regurgitant volume. There appeared to be possible multiple jets. MAIK was discussed with patient last visit and it was ordered however patient did not get it done because of schedule problem. He is ready to have this procedure again. Discuss with patient about MAIK procedure. Risk, benefit and alternatives discussed with patient about procedure. All complication of procedure including but not limited to emergent thoracic surgery, esophageal damage other complication discussed in detail. Would have a low threshold to send patient to CT surgery clinic for possible valve repair depending on MAIK finding especially now he has atrial fibrillation    Atrial fibrillation:  Diagnosed in March 2020  Not taking metoprolol for last 2 weeks.   Have asked patient to start taking metoprolol as prescribed and be compliant with the medication. I have asked patient to start taking aspirin as well as mention previously for stroke prophylaxis  No symptoms related to A. fib at this time    This plan was discussed with patient who is in agreement. Thank you for allowing me to participate in patient care. Please feel free to call me if you have any question or concern. Hira Mayer MD  Please note: This document has been produced using voice recognition software. Unrecognized errors in transcription may be present.

## 2020-06-23 NOTE — PATIENT INSTRUCTIONS
[facility]          Patient  EP Instructions      1. You are scheduled to have a MAIK on July 1, 2020 at 10 am. Please arrive at 8 am.    2. Please go to [facility] in the outpatient parking lot. Once you enter check in with the  there. The  will either give you directions or assist you in getting to the cath holding area. 3.  [x]       You are not to eat or drink anything after midnight the morning of the               procedure. 4. Please continue to take your medications with a small sip of water on the morning of the procedure with the following exceptions:      5. If you are diabetic, do not take your insulin/sugar pill the morning of the procedure. Pre -procedure LABWORK is to be done within one week of your procedure date     6. We encourage families to wait in the waiting room on the first floor while the procedure is being done. The Doctor will come out and talk with you as soon as the procedure is over. 7. There is the possibility that you may spend the night in the hospital, depending on the results of the procedure. This will be determined after the procedure is done. 8.   If you or your family have any questions, please call our office Monday-Friday 9:00am         -4:30 pm , at [phone], and ask to speak to one of the nurses.

## 2020-06-24 LAB
BUN SERPL-MCNC: 7 MG/DL (ref 6–20)
BUN/CREAT SERPL: 9 (ref 9–20)
CALCIUM SERPL-MCNC: 9.3 MG/DL (ref 8.7–10.2)
CHLORIDE SERPL-SCNC: 103 MMOL/L (ref 96–106)
CO2 SERPL-SCNC: 20 MMOL/L (ref 20–29)
CREAT SERPL-MCNC: 0.79 MG/DL (ref 0.76–1.27)
GLUCOSE SERPL-MCNC: 99 MG/DL (ref 65–99)
POTASSIUM SERPL-SCNC: 3.8 MMOL/L (ref 3.5–5.2)
SODIUM SERPL-SCNC: 139 MMOL/L (ref 134–144)

## 2020-06-25 ENCOUNTER — DOCUMENTATION ONLY (OUTPATIENT)
Dept: CARDIOLOGY CLINIC | Age: 35
End: 2020-06-25

## 2020-06-25 NOTE — PROGRESS NOTES
Contacted pt at [facility]. Two patient Identifiers confirmed. MAIK approved from 06/25/2020 through 07/24/2020 Auth # [id number]. No other issues noted.

## 2020-07-01 ENCOUNTER — ANESTHESIA (OUTPATIENT)
Dept: SURGERY | Age: 35
End: 2020-07-01
Payer: OTHER GOVERNMENT

## 2020-07-01 ENCOUNTER — ANESTHESIA EVENT (OUTPATIENT)
Dept: SURGERY | Age: 35
End: 2020-07-01
Payer: OTHER GOVERNMENT

## 2020-07-01 ENCOUNTER — HOSPITAL ENCOUNTER (OUTPATIENT)
Dept: NON INVASIVE DIAGNOSTICS | Age: 35
Setting detail: OUTPATIENT SURGERY
Discharge: HOME | End: 2020-07-01
Payer: OTHER GOVERNMENT

## 2020-07-01 ENCOUNTER — HOSPITAL ENCOUNTER (OUTPATIENT)
Age: 35
Setting detail: OUTPATIENT SURGERY
Discharge: HOME | End: 2020-07-01
Attending: RADIOLOGY | Admitting: RADIOLOGY
Payer: OTHER GOVERNMENT

## 2020-07-01 VITALS
HEIGHT: 72 IN | BODY MASS INDEX: 33.03 KG/M2 | OXYGEN SATURATION: 96 % | WEIGHT: 243.9 LBS | RESPIRATION RATE: 16 BRPM | TEMPERATURE: 98.1 F | HEART RATE: 74 BPM | SYSTOLIC BLOOD PRESSURE: 104 MMHG | DIASTOLIC BLOOD PRESSURE: 71 MMHG

## 2020-07-01 VITALS
BODY MASS INDEX: 32.91 KG/M2 | WEIGHT: 243 LBS | DIASTOLIC BLOOD PRESSURE: 79 MMHG | SYSTOLIC BLOOD PRESSURE: 123 MMHG | HEIGHT: 72 IN

## 2020-07-01 DIAGNOSIS — I34.1 MVP (MITRAL VALVE PROLAPSE): ICD-10-CM

## 2020-07-01 LAB
ECHO MV EROA PISA: 0.76 CM2
ECHO MV REGURGITANT RADIUS PISA: 1.18 CM
ECHO MV REGURGITANT VOLUME: 78.36 ML
ECHO TV REGURGITANT MAX VELOCITY: 453.18 CM/S
ECHO TV REGURGITANT VTI: 102.74 CM

## 2020-07-01 PROCEDURE — 76060000032 HC ANESTHESIA 0.5 TO 1 HR

## 2020-07-01 PROCEDURE — 74011250636 HC RX REV CODE- 250/636: Performed by: NURSE ANESTHETIST, CERTIFIED REGISTERED

## 2020-07-01 PROCEDURE — 74011000250 HC RX REV CODE- 250: Performed by: NURSE ANESTHETIST, CERTIFIED REGISTERED

## 2020-07-01 PROCEDURE — 93325 DOPPLER ECHO COLOR FLOW MAPG: CPT

## 2020-07-01 PROCEDURE — 76210000021 HC REC RM PH II 0.5 TO 1 HR

## 2020-07-01 RX ORDER — MAGNESIUM SULFATE 100 %
4 CRYSTALS MISCELLANEOUS AS NEEDED
Status: DISCONTINUED | OUTPATIENT
Start: 2020-07-01 | End: 2020-07-01 | Stop reason: HOSPADM

## 2020-07-01 RX ORDER — ONDANSETRON 2 MG/ML
INJECTION INTRAMUSCULAR; INTRAVENOUS AS NEEDED
Status: DISCONTINUED | OUTPATIENT
Start: 2020-07-01 | End: 2020-07-01 | Stop reason: HOSPADM

## 2020-07-01 RX ORDER — LIDOCAINE HYDROCHLORIDE 20 MG/ML
INJECTION, SOLUTION EPIDURAL; INFILTRATION; INTRACAUDAL; PERINEURAL AS NEEDED
Status: DISCONTINUED | OUTPATIENT
Start: 2020-07-01 | End: 2020-07-01 | Stop reason: HOSPADM

## 2020-07-01 RX ORDER — SODIUM CHLORIDE, SODIUM LACTATE, POTASSIUM CHLORIDE, CALCIUM CHLORIDE 600; 310; 30; 20 MG/100ML; MG/100ML; MG/100ML; MG/100ML
INJECTION, SOLUTION INTRAVENOUS
Status: DISCONTINUED | OUTPATIENT
Start: 2020-07-01 | End: 2020-07-01 | Stop reason: HOSPADM

## 2020-07-01 RX ORDER — DEXTROSE MONOHYDRATE 100 MG/ML
125-250 INJECTION, SOLUTION INTRAVENOUS AS NEEDED
Status: DISCONTINUED | OUTPATIENT
Start: 2020-07-01 | End: 2020-07-01 | Stop reason: HOSPADM

## 2020-07-01 RX ORDER — PROPOFOL 10 MG/ML
INJECTION, EMULSION INTRAVENOUS AS NEEDED
Status: DISCONTINUED | OUTPATIENT
Start: 2020-07-01 | End: 2020-07-01 | Stop reason: HOSPADM

## 2020-07-01 RX ORDER — SODIUM CHLORIDE 0.9 % (FLUSH) 0.9 %
5-40 SYRINGE (ML) INJECTION AS NEEDED
Status: DISCONTINUED | OUTPATIENT
Start: 2020-07-01 | End: 2020-07-01 | Stop reason: HOSPADM

## 2020-07-01 RX ORDER — ONDANSETRON 2 MG/ML
4 INJECTION INTRAMUSCULAR; INTRAVENOUS
Status: DISCONTINUED | OUTPATIENT
Start: 2020-07-01 | End: 2020-07-01 | Stop reason: HOSPADM

## 2020-07-01 RX ORDER — FENTANYL CITRATE 50 UG/ML
INJECTION, SOLUTION INTRAMUSCULAR; INTRAVENOUS AS NEEDED
Status: DISCONTINUED | OUTPATIENT
Start: 2020-07-01 | End: 2020-07-01 | Stop reason: HOSPADM

## 2020-07-01 RX ORDER — SODIUM CHLORIDE, SODIUM LACTATE, POTASSIUM CHLORIDE, CALCIUM CHLORIDE 600; 310; 30; 20 MG/100ML; MG/100ML; MG/100ML; MG/100ML
25 INJECTION, SOLUTION INTRAVENOUS CONTINUOUS
Status: DISCONTINUED | OUTPATIENT
Start: 2020-07-01 | End: 2020-07-01 | Stop reason: HOSPADM

## 2020-07-01 RX ORDER — SODIUM CHLORIDE 0.9 % (FLUSH) 0.9 %
5-40 SYRINGE (ML) INJECTION EVERY 8 HOURS
Status: DISCONTINUED | OUTPATIENT
Start: 2020-07-01 | End: 2020-07-01 | Stop reason: HOSPADM

## 2020-07-01 RX ADMIN — PROPOFOL 30 MG: 10 INJECTION, EMULSION INTRAVENOUS at 10:51

## 2020-07-01 RX ADMIN — FENTANYL CITRATE 25 MCG: 50 INJECTION, SOLUTION INTRAMUSCULAR; INTRAVENOUS at 10:48

## 2020-07-01 RX ADMIN — PROPOFOL 10 MG: 10 INJECTION, EMULSION INTRAVENOUS at 11:07

## 2020-07-01 RX ADMIN — PROPOFOL 20 MG: 10 INJECTION, EMULSION INTRAVENOUS at 10:59

## 2020-07-01 RX ADMIN — PROPOFOL 10 MG: 10 INJECTION, EMULSION INTRAVENOUS at 10:56

## 2020-07-01 RX ADMIN — PROPOFOL 10 MG: 10 INJECTION, EMULSION INTRAVENOUS at 11:00

## 2020-07-01 RX ADMIN — PROPOFOL 10 MG: 10 INJECTION, EMULSION INTRAVENOUS at 10:54

## 2020-07-01 RX ADMIN — PROPOFOL 10 MG: 10 INJECTION, EMULSION INTRAVENOUS at 10:52

## 2020-07-01 RX ADMIN — LIDOCAINE HYDROCHLORIDE 60 MG: 20 INJECTION, SOLUTION INTRAVENOUS at 10:50

## 2020-07-01 RX ADMIN — ONDANSETRON 4 MG: 2 SOLUTION INTRAMUSCULAR; INTRAVENOUS at 11:10

## 2020-07-01 RX ADMIN — PROPOFOL 10 MG: 10 INJECTION, EMULSION INTRAVENOUS at 11:02

## 2020-07-01 RX ADMIN — PROPOFOL 10 MG: 10 INJECTION, EMULSION INTRAVENOUS at 11:04

## 2020-07-01 RX ADMIN — PROPOFOL 10 MG: 10 INJECTION, EMULSION INTRAVENOUS at 10:53

## 2020-07-01 RX ADMIN — PROPOFOL 120 MG: 10 INJECTION, EMULSION INTRAVENOUS at 10:50

## 2020-07-01 RX ADMIN — PROPOFOL 10 MG: 10 INJECTION, EMULSION INTRAVENOUS at 10:58

## 2020-07-01 RX ADMIN — PROPOFOL 10 MG: 10 INJECTION, EMULSION INTRAVENOUS at 10:57

## 2020-07-01 RX ADMIN — SODIUM CHLORIDE, SODIUM LACTATE, POTASSIUM CHLORIDE, AND CALCIUM CHLORIDE: 600; 310; 30; 20 INJECTION, SOLUTION INTRAVENOUS at 10:34

## 2020-07-01 RX ADMIN — FENTANYL CITRATE 25 MCG: 50 INJECTION, SOLUTION INTRAMUSCULAR; INTRAVENOUS at 10:54

## 2020-07-01 RX ADMIN — PROPOFOL 10 MG: 10 INJECTION, EMULSION INTRAVENOUS at 10:55

## 2020-07-01 RX ADMIN — LIDOCAINE HYDROCHLORIDE 40 MG: 20 INJECTION, SOLUTION INTRAVENOUS at 10:52

## 2020-07-01 RX ADMIN — PROPOFOL 10 MG: 10 INJECTION, EMULSION INTRAVENOUS at 11:01

## 2020-07-01 NOTE — DISCHARGE INSTRUCTIONS
Patient Education     DISCHARGE SUMMARY from Nurse    PATIENT INSTRUCTIONS:    After general anesthesia or intravenous sedation, for 24 hours or while taking prescription Narcotics:  · Limit your activities  · Do not drive and operate hazardous machinery  · Do not make important personal or business decisions  · Do  not drink alcoholic beverages  · If you have not urinated within 8 hours after discharge, please contact your surgeon on call. Report the following to your surgeon:  · Excessive pain, swelling, redness or odor of or around the surgical area  · Temperature over 100.5  · Nausea and vomiting lasting longer than 4 hours or if unable to take medications  · Any signs of decreased circulation or nerve impairment to extremity: change in color, persistent  numbness, tingling, coldness or increase pain  · Any questions      These are general instructions for a healthy lifestyle:    No smoking/ No tobacco products/ Avoid exposure to second hand smoke  Surgeon General's Warning:  Quitting smoking now greatly reduces serious risk to your health. Obesity, smoking, and sedentary lifestyle greatly increases your risk for illness    A healthy diet, regular physical exercise & weight monitoring are important for maintaining a healthy lifestyle    You may be retaining fluid if you have a history of heart failure or if you experience any of the following symptoms:  Weight gain of 3 pounds or more overnight or 5 pounds in a week, increased swelling in our hands or feet or shortness of breath while lying flat in bed. Please call your doctor as soon as you notice any of these symptoms; do not wait until your next office visit. The discharge information has been reviewed with the patient. The patient verbalized understanding.   Discharge medications reviewed with the patient and appropriate educational materials and side effects teaching were provided. ___________________________________________________________________________________________________________________________________    Patient armband removed and shredded    Patient Education        Transesophageal Echocardiogram: What to Expect at Home  Your Recovery  A transesophageal echocardiogram is a test to help your doctor look at the inside of your heart. A small device called a transducer directs sound waves toward your heart. The sound waves make a picture of the heart's valves and chambers. Before the test, your throat was sprayed with medicine to numb it. Your throat may be sore for a few days. You may have had a sedative to help you relax. You may be unsteady after having sedation. It can take a few hours for the medicine's effects to wear off. Common side effects include nausea, vomiting, and feeling sleepy or tired. This care sheet gives you a general idea about how long it will take for you to recover. But each person recovers at a different pace. Follow the steps below to feel better as quickly as possible. How can you care for yourself at home? Activity  · If a sedative was used, your doctor will tell you when it is safe for you to do your normal activities. · For your safety, do not drive or operate any machinery that could be dangerous. Wait until the medicine wears off and you can think clearly and react easily. Diet  · Do not eat or drink until the numbness in your throat wears off. · When the numbness is gone, you can eat your normal diet. Follow-up care is a key part of your treatment and safety. Be sure to make and go to all appointments, and call your doctor if you are having problems. It's also a good idea to know your test results and keep a list of the medicines you take. When should you call for help? [id number] anytime you think you may need emergency care. For example, call if:  · Your stools are maroon or very bloody.   · You vomit blood or what looks like coffee grounds. Call your doctor now or seek immediate medical care if:  · You have pain in your chest, belly, or back. · You have new or worse trouble swallowing. · You have trouble breathing. Watch closely for changes in your health, and be sure to contact your doctor if you have any problems. Where can you learn more? Go to http://www.gray.com/  Enter Z806 in the search box to learn more about \"Transesophageal Echocardiogram: What to Expect at Home. \"  Current as of: December 16, 2019               Content Version: 12.5  © 1419-1669 [employer]. Care instructions adapted under license by [employer] (which disclaims liability or warranty for this information). If you have questions about a medical condition or this instruction, always ask your healthcare professional. [address] any warranty or liability for your use of this information. Patient Education        Learning About Coronavirus [phone])  Coronavirus [phone]): Overview  What is coronavirus (ENXNR-56)? The coronavirus disease (COVID-19) is caused by a virus. It is an illness that was first found in [place], [place], in December 2019. It has since spread worldwide. The virus can cause fever, cough, and trouble breathing. In severe cases, it can cause pneumonia and make it hard to breathe without help. It can cause death. Coronaviruses are a large group of viruses. They cause the common cold. They also cause more serious illnesses like Middle East respiratory syndrome (MERS) and severe acute respiratory syndrome (SARS). COVID-19 is caused by a novel coronavirus. That means it's a new type that has not been seen in people before. This virus spreads person-to-person through droplets from coughing and sneezing. It can also spread when you are close to someone who is infected.  And it can spread when you touch something that has the virus on it, such as a doorknob or a tabletop. What can you do to protect yourself from coronavirus (COVID-19)? The best way to protect yourself from getting sick is to:  · Avoid areas where there is an outbreak. · Avoid contact with people who may be infected. · Wash your hands often with soap or alcohol-based hand sanitizers. · Avoid crowds and try to stay at least 6 feet away from other people. · Wash your hands often, especially after you cough or sneeze. Use soap and water, and scrub for at least 20 seconds. If soap and water aren't available, use an alcohol-based hand . · Avoid touching your mouth, nose, and eyes. What can you do to avoid spreading the virus to others? To help avoid spreading the virus to others:  · Cover your mouth with a tissue when you cough or sneeze. Then throw the tissue in the trash. · Use a disinfectant to clean things that you touch often. · Wear a cloth face cover if you have to go to public areas. · Stay home if you are sick or have been exposed to the virus. Don't go to school, work, or public areas. And don't use public transportation, ride-shares, or taxis unless you have no choice. · If you are sick:  ? Leave your home only if you need to get medical care. But call the doctor's office first so they know you're coming. And wear a face cover. ? Wear the face cover whenever you're around other people. It can help stop the spread of the virus when you cough or sneeze. ? Clean and disinfect your home every day. Use household  and disinfectant wipes or sprays. Take special care to clean things that you grab with your hands. These include doorknobs, remote controls, phones, and handles on your refrigerator and microwave. And don't forget countertops, tabletops, bathrooms, and computer keyboards. When to call for help  [id number] anytime you think you may need emergency care. For example, call if:  · You have severe trouble breathing.  (You can't talk at all.)  · You have constant chest pain or pressure. · You are severely dizzy or lightheaded. · You are confused or can't think clearly. · Your face and lips have a blue color. · You pass out (lose consciousness) or are very hard to wake up. Call your doctor now if you develop symptoms such as:  · Shortness of breath. · Fever. · Cough. If you need to get care, call ahead to the doctor's office for instructions before you go. Make sure you wear a face cover to prevent exposing other people to the virus. Where can you get the latest information? The following health organizations are tracking and studying this virus. Their websites contain the most up-to-date information. Idalia Kearney also learn what to do if you think you may have been exposed to the virus. · U.S. Centers for Disease Control and Prevention (CDC): The CDC provides updated news about the disease and travel advice. The website also tells you how to prevent the spread of infection. www.cdc.gov  · World Health Organization [facility]): WHO offers information about the virus outbreaks. WHO also has travel advice. www.who.int  Current as of: May 8, 2020               Content Version: 12.5  © 2006-2020 [employer]. Care instructions adapted under license by [employer] (which disclaims liability or warranty for this information). If you have questions about a medical condition or this instruction, always ask your healthcare professional. [address] any warranty or liability for your use of this information.

## 2020-07-01 NOTE — ANESTHESIA PREPROCEDURE EVALUATION
Relevant Problems   No relevant active problems       Anesthetic History               Review of Systems / Medical History  Patient summary reviewed and pertinent labs reviewed    Pulmonary          Smoker         Neuro/Psych   Within defined limits           Cardiovascular            Dysrhythmias : atrial fibrillation           GI/Hepatic/Renal                Endo/Other             Other Findings              Physical Exam    Airway  Mallampati: III  TM Distance: 4 - 6 cm  Neck ROM: normal range of motion   Mouth opening: Diminished (comment)     Cardiovascular    Rhythm: irregular  Rate: normal         Dental    Dentition: Poor dentition     Pulmonary  Breath sounds clear to auscultation               Abdominal  GI exam deferred       Other Findings            Anesthetic Plan    ASA: 2  Anesthesia type: MAC            Anesthetic plan and risks discussed with: Patient

## 2020-07-01 NOTE — PERIOP NOTES
Pre-Op Summary    Pt arrived via car with family/friend and is oriented to time, place, person and situation. Patient with steady gait with none assistive devices. Visit Vitals  /79 (BP 1 Location: Right arm, BP Patient Position: Sitting)   Pulse 82   Temp 97.4 °F (36.3 °C)   Ht 6' (1.829 m)   Wt 110.6 kg (243 lb 14.4 oz)   SpO2 94%   BMI 33.08 kg/m²       Peripheral IV located on Left hand . Patients belongings are located [facility]. Patient's point of contact is Yasmine Castro and their contact number is: [phone]. They will be leaving and coming back. They are able to receive medication information. They will be their ride home.

## 2020-07-01 NOTE — H&P
Please see clinic note  MAIK as planned  All questions answered. Procedure reviewed with patient again  Risk benefit and alternatives discussed.  Complications discussed

## 2020-07-01 NOTE — ANESTHESIA POSTPROCEDURE EVALUATION
Procedure(s):  MAIK.     MAC    Anesthesia Post Evaluation      Multimodal analgesia: multimodal analgesia used between 6 hours prior to anesthesia start to PACU discharge  Patient location during evaluation: bedside  Patient participation: complete - patient participated  Level of consciousness: awake  Pain management: adequate  Airway patency: patent  Anesthetic complications: no  Cardiovascular status: stable  Respiratory status: acceptable  Hydration status: acceptable  Post anesthesia nausea and vomiting:  controlled      INITIAL Post-op Vital signs:   Vitals Value Taken Time   /71 7/1/2020 11:56 AM   Temp 36.7 °C (98.1 °F) 7/1/2020 11:56 AM   Pulse 74 7/1/2020 11:56 AM   Resp 16 7/1/2020 11:56 AM   SpO2 96 % 7/1/2020 11:56 AM

## 2020-07-01 NOTE — PERIOP NOTES
Assumed care of pt following MAIK procedure. Drowsy. Denies pain or discomforts. Attached to monitor. Blood pressure slightly low. Fluids infusing. Will monitor.

## 2020-08-12 ENCOUNTER — OFFICE VISIT (OUTPATIENT)
Dept: CARDIOLOGY CLINIC | Age: 35
End: 2020-08-12

## 2020-08-12 VITALS
SYSTOLIC BLOOD PRESSURE: 118 MMHG | BODY MASS INDEX: 33.15 KG/M2 | HEART RATE: 96 BPM | WEIGHT: 244.4 LBS | OXYGEN SATURATION: 96 % | DIASTOLIC BLOOD PRESSURE: 73 MMHG | TEMPERATURE: 97 F | RESPIRATION RATE: 18 BRPM

## 2020-08-12 DIAGNOSIS — I48.0 PAF (PAROXYSMAL ATRIAL FIBRILLATION) (HCC): Primary | ICD-10-CM

## 2020-08-12 RX ORDER — ASPIRIN 325 MG
325 TABLET ORAL DAILY
COMMUNITY
End: 2022-10-06

## 2020-08-12 RX ORDER — METOPROLOL SUCCINATE 25 MG/1
TABLET, EXTENDED RELEASE ORAL DAILY
COMMUNITY
End: 2022-10-06

## 2020-08-12 NOTE — PROGRESS NOTES
[facility]    Mr. Florentin Horvath is 28 y.o. male with a history of mitral valve prolapse, mitral regurgitation and atrial fibrillation. Patient is here today for follow-up appointment. Patient underwent MAIK since last visit. He has no new symptoms to report. He is able to perform activity of daily living without any complaint. He has minimal dyspnea with moderate to severe activity and has been stable for years. No change in daily activity pattern. He denies any palpitation. He has ran out of beta-blocker. Denies any nausea, vomiting, abdominal pain, fever, chills, sputum production. No hematuria or other bleeding complaints    Past Medical History:   Diagnosis Date    Fetal alcohol syndrome     Mitral regurgitation     MVP (mitral valve prolapse)          No past surgical history on file. No current outpatient medications on file. No current facility-administered medications for this visit. Allergies and Sensitivities:  Allergies   Allergen Reactions    Codeine Unknown (comments)    Novahistine [Chlorpheniramine-Phenylephrine] Unknown (comments)       Family History:  No family history on file. Social History:  Social History     Tobacco Use    Smoking status: Current Every Day Smoker     Types: Cigarettes     Start date: 2008    Smokeless tobacco: Never Used   Substance Use Topics    Alcohol use: Not on file    Drug use: Not on file     He  reports that he has been smoking cigarettes. He started smoking about 12 years ago. He has never used smokeless tobacco.  He  has no history on file for alcohol. Review of Systems:  Cardiac symptoms as noted above in HPI. All others negative. Denies fatigue, malaise, skin rash, joint pain, blurring vision, photophobia, neck pain, hemoptysis, chronic cough, nausea, vomiting, hematuria, burning micturition, BRBPR, chronic headaches.     Physical Exam:  BP Readings from Last 3 Encounters:   08/12/20 118/73   07/01/20 123/79   07/01/20 104/71         Pulse Readings from Last 3 Encounters:   08/12/20 96   07/01/20 74   06/23/20 86          Wt Readings from Last 3 Encounters:   08/12/20 244 lb 6.4 oz (110.9 kg)   07/01/20 243 lb (110.2 kg)   07/01/20 243 lb 14.4 oz (110.6 kg)       Constitutional: Oriented to person, place, and time. HENT: Head: Normocephalic and atraumatic. Neck: No JVD present. Carotid bruit is not appreciated. Cardiovascular: Regular rhythm   no gallop or rubs appreciated. Systolic  murmur on LSB and apex   Lung: Breath sounds normal. No respiratory distress. No ronchi or rales appreciated  Abdominal: No tenderness. No rebound and no guarding. Musculoskeletal: There is no lower extremity edema. No cynosis      Review of Data  LABS:   Lab Results   Component Value Date/Time    Sodium 139 06/23/2020 12:00 AM    Potassium 3.8 06/23/2020 12:00 AM    Chloride 103 06/23/2020 12:00 AM    CO2 20 06/23/2020 12:00 AM    Glucose 99 06/23/2020 12:00 AM    BUN 7 06/23/2020 12:00 AM    Creatinine 0.79 06/23/2020 12:00 AM     No flowsheet data found. No results found for: ALT  No results found for: HBA1C, HGBE8, YWV7NNTX, KMK2IKFE, MHS6RUGI    EKG  (01/19) sinus rhythm at 64 bpm.  Nonspecific T wave changes. Otherwise normal EKG  (3/20) A.fib    ECHO (01/19)  Left Ventricle Normal cavity size, wall thickness, systolic function (ejection fraction normal) and diastolic function. The estimated ejection fraction is 61 - 65%. Visually measured ejection fraction. Left Atrium The cavity size is moderately dilated. Left Atrium volume index is 49 mL/m2. Right Ventricle Normal cavity size and global systolic function. Right Atrium Normal size. Aortic Valve Trileaflet aortic valve structure. No stenosis and no regurgitation. Mitral Valve No stenosis. Mitral valve prolapse. Myxomatous mitral valve disease. Probably moderate to severe MR.  There is moderate prolapse of the posterior leaflet. Tricuspid Valve Normal valve structure and no stenosis. Trace tricuspid valve regurgitation. Pulmonary arterial systolic pressure is 78.1 mmHg. There is no evidence of pulmonary hypertension. Pulmonic Valve Normal valve structure, no stenosis and no regurgitation. Aorta Normal aortic root, ascending aortic, and aortic arch. Normal aortic root. IVC/Hepatic Veins Mildly elevated central venous pressure (5-10 mmHg); IVC diameter is less than 21 mm and collapses less than 50% with respiration. IMPRESSION & PLAN:    Mr. Dania Calderon is 28 y.o. male with no significant past medical history except possible fetal alcohol syndrome baby    Mitral valve prolapse /mitral regurgitation:  Patient had a echocardiogram in January 2019 which showed posterior leaflet prolapse of mitral valve along with possible moderate to severe mitral regurgitation. MAIK in June 2020 showed moderate regurgitation in the setting of posterior valve prolapse   Patient has no fluid overload on exam.  In absence of any symptoms, recommend for clinical observation and serial echocardiogram.    atrial fibrillation:  Diagnosed in March 2020  On exam and by EKG in sinus rhythm. Supposed to be on a beta-blocker however has been noncompliant with this medication. Starting Toprol-XL 25 mg daily again. He claims that he is taking aspirin. Have advised patient to keep taking aspirin 81 over-the-counter    This plan was discussed with patient who is in agreement. Thank you for allowing me to participate in patient care. Please feel free to call me if you have any question or concern. Azeb Mcdowell MD  Please note: This document has been produced using voice recognition software. Unrecognized errors in transcription may be present.

## 2020-11-09 ENCOUNTER — VIRTUAL VISIT (OUTPATIENT)
Dept: FAMILY MEDICINE CLINIC | Age: 35
End: 2020-11-09
Payer: COMMERCIAL

## 2020-11-09 DIAGNOSIS — Z76.89 ENCOUNTER TO ESTABLISH CARE WITH NEW DOCTOR: Primary | ICD-10-CM

## 2020-11-09 DIAGNOSIS — R25.1 SHAKES: ICD-10-CM

## 2020-11-09 PROCEDURE — 99203 OFFICE O/P NEW LOW 30 MIN: CPT | Performed by: STUDENT IN AN ORGANIZED HEALTH CARE EDUCATION/TRAINING PROGRAM

## 2020-11-10 NOTE — PROGRESS NOTES
Stanley Zhu, who was evaluated through a synchronous (real-time) audio-video encounter, and/or his healthcare decision maker, is aware that it is a billable service, with coverage as determined by his insurance carrier. He provided verbal consent to proceed: Yes, and patient identification was verified. It was conducted pursuant to the emergency declaration under the [address] [facility], [facility] and the [employer]. A caregiver was present when appropriate. Ability to conduct physical exam was limited. I was in the office. The patient was at home. History of Present Illness  Stanley Zhu is a 28 y.o. male who presents today for management of    Chief Complaint   Patient presents with   [facility] Establish Care    Tremors       Patient is here to establish care. Patient would like to discuss episodes that he has been having since he was young of sudden shakes. Patient states these episodes are getting worse for when he was young. Feels like he has issues opening cans of soda or holding fluids because he might spill them. States that this is starting to interfere with his life, as he is unsure when this episodes will occur. He does feel that if he is able to focus his mind on something else he might not get other immediately recurring episodes and might even make the current episode to stop. Of note patient states when he was growing up he did had a head injury where he was with his dad and he fell and his head went through a glass table. Also patient required special education throughout his whole \"school career\".     Past Medical History  Past Medical History:   Diagnosis Date    Fetal alcohol syndrome     Mitral regurgitation     MVP (mitral valve prolapse)         Surgical History  Past Surgical History:   Procedure Laterality Date    HX TONSIL AND ADENOIDECTOMY          Current Medications  Current Outpatient Medications   Medication Sig    aspirin delayed-release 81 mg tablet Take  by mouth daily.  metoprolol succinate (Toprol XL) 25 mg XL tablet Take  by mouth daily. No current facility-administered medications for this visit. Allergies/Drug Reactions  Allergies   Allergen Reactions    Codeine Unknown (comments)    Novahistine [Chlorpheniramine-Phenylephrine] Unknown (comments)        Family History  No family history on file. Social History  Social History     Tobacco Use    Smoking status: Current Every Day Smoker     Types: Cigarettes     Start date: 2008    Smokeless tobacco: Never Used   Substance Use Topics    Alcohol use: Not on file    Drug use: Not on file        Health Maintenance   Topic Date Due    Pneumococcal 0-64 years (1 of 1 - PPSV23) 08/06/1991    DTaP/Tdap/Td series (1 - Tdap) 08/06/2006    Flu Vaccine (1) 09/01/2020       There is no immunization history on file for this patient. Review of Systems  Review of Systems   Constitutional: Negative for chills, fever and malaise/fatigue. HENT: Negative for congestion, ear discharge and ear pain. Eyes: Negative for blurred vision, pain and discharge. Respiratory: Negative for cough and shortness of breath. Cardiovascular: Negative for chest pain and palpitations. Gastrointestinal: Negative for abdominal pain, nausea and vomiting. Genitourinary: Negative for dysuria, frequency and urgency. Skin: Negative for itching and rash. Neurological: Positive for tremors. Negative for dizziness, seizures, loss of consciousness and headaches. Psychiatric/Behavioral: Negative for substance abuse. Physical Exam  Vital signs: There were no vitals filed for this visit. Physical Exam  Constitutional:       General: He is not in acute distress. Appearance: Normal appearance. He is not ill-appearing. HENT:      Nose: Nose normal. No congestion or rhinorrhea.    Eyes: General:         Right eye: No discharge. Left eye: No discharge. Conjunctiva/sclera: Conjunctivae normal.   Neck:      Musculoskeletal: Normal range of motion and neck supple. Pulmonary:      Effort: Pulmonary effort is normal.   Neurological:      Mental Status: He is alert and oriented to person, place, and time. Psychiatric:         Attention and Perception: Attention and perception normal.         Mood and Affect: Mood normal.         Speech: Speech normal.         Behavior: Behavior normal.         Thought Content: Thought content normal.         Judgment: Judgment normal.       6/24/2020  7:37 AM - [place], [facility] Lab Results In     Component  Value  Flag  Ref Range  Units  Status    Glucose  99   65 - 99  mg/dL  Final    BUN  7   6 - 20  mg/dL  Final    Creatinine  0.79   0.76 - 1.27  mg/dL  Final    GFR est non-AA  117   >59  mL/min/1.73  Final    GFR est AA  135   >59  mL/min/1.73  Final    BUN/Creatinine ratio  9   9 - 20   Final    Sodium  139   134 - 144  mmol/L  Final    Potassium  3.8   3.5 - 5.2  mmol/L  Final    Chloride  103   96 - 106  mmol/L  Final    CO2  20   20 - 29  mmol/L  Final    Calcium  9.3   8.7 - 10.2  mg/dL  Final      3/18/2020  9:19 AM - [place], Lab In [facility]     Component  Value  Flag  Ref Range  Units  Status    Magnesium  2.0   1.6 - 2.6  mg/dL  Final      TSH  0.36 - 3.74 uIU/mL  2.60                 TROPONIN I   Order: [id number]   Collected:  3/18/2020 08:38   View Full Report   Ref Range & Units  7mo ago   Troponin-I, QT  0.0 - 0.045 NG/ML  <0.02             Ref Range & Units  7mo ago   WBC  4.6 - 13.2 K/uL  11.3     RBC  4.70 - 5.50 M/uL  5.35     HGB  13.0 - 16.0 g/dL  16.9High       HCT  36.0 - 48.0 %  47.4     MCV  74.0 - 97.0 FL  88.6     MCH  24.0 - 34.0 PG  31.6     MCHC  31.0 - 37.0 g/dL  35.7     RDW  11.6 - 14.5 %  12.8     PLATELET  251 - 675 K/uL  176     MPV  9.2 - 11.8 FL  12. 6High       NEUTROPHILS  40 - 73 %  58     LYMPHOCYTES  21 - 52 %  31 MONOCYTES  3 - 10 %  7     EOSINOPHILS  0 - 5 %  3     BASOPHILS  0 - 2 %  1     ABS. NEUTROPHILS  1.8 - 8.0 K/UL  6.5     ABS. LYMPHOCYTES  0.9 - 3.6 K/UL  3.5     ABS. MONOCYTES  0.05 - 1.2 K/UL  0.8     ABS. EOSINOPHILS  0.0 - 0.4 K/UL  0.4     ABS. BASOPHILS  0.0 - 0.1 K/UL  0.1        EKG  (01/19) sinus rhythm at 64 bpm.  Nonspecific T wave changes. Otherwise normal EKG  (3/20) A.fib     ECHO (01/19)  Left Ventricle Normal cavity size, wall thickness, systolic function (ejection fraction normal) and diastolic function. The estimated ejection fraction is 61 - 65%. Visually measured ejection fraction. Left Atrium The cavity size is moderately dilated. Left Atrium volume index is 49 mL/m2. Right Ventricle Normal cavity size and global systolic function. Right Atrium Normal size. Aortic Valve Trileaflet aortic valve structure. No stenosis and no regurgitation. Mitral Valve No stenosis. Mitral valve prolapse. Myxomatous mitral valve disease. Probably moderate to severe MR. There is moderate prolapse of the posterior leaflet. Tricuspid Valve Normal valve structure and no stenosis. Trace tricuspid valve regurgitation. Pulmonary arterial systolic pressure is 63.6 mmHg. There is no evidence of pulmonary hypertension. Pulmonic Valve Normal valve structure, no stenosis and no regurgitation. Aorta Normal aortic root, ascending aortic, and aortic arch. Normal aortic root. IVC/Hepatic Veins Mildly elevated central venous pressure (5-10 mmHg); IVC diameter is less than 21 mm and collapses less than 50% with respiration. MAIK in June 2020 showed moderate regurgitation in the setting of posterior valve prolapse       Assessment/Plan:    1. Encounter to establish care with new doctor  Labs seem to be up-to-date. Patient follows up with cardiologist, for mitral valve prolapse and A. fib.     2. Shakes  Patient will need to be seen in person to do a neurological test.  He does have a history of fetal alcohol syndrome on chart. We will need to further discuss this with patient. Also will need to discuss with him if alcohol might be factor that may be bringing this \" shake\" episode. Could possibly give a referral to neurology. Lab review: labs are reviewed, up to date and normal      I have discussed the diagnosis with the patient and the intended plan as seen in the above orders. Questions were answered concerning future plans. I have discussed medication side effects and warnings with the patient as well. I have reviewed the plan of care with the patient, accepted their input and they are in agreement with the treatment goals.        Barron Borjas MD

## 2020-11-11 PROBLEM — I34.1 MITRAL VALVE PROLAPSE: Chronic | Status: ACTIVE | Noted: 2020-11-11

## 2020-11-11 PROBLEM — I48.20 CHRONIC ATRIAL FIBRILLATION (HCC): Status: ACTIVE | Noted: 2020-11-11

## 2021-02-11 ENCOUNTER — OFFICE VISIT (OUTPATIENT)
Dept: FAMILY MEDICINE CLINIC | Age: 36
End: 2021-02-11
Payer: COMMERCIAL

## 2021-02-11 VITALS
WEIGHT: 252.6 LBS | HEIGHT: 72 IN | RESPIRATION RATE: 19 BRPM | SYSTOLIC BLOOD PRESSURE: 134 MMHG | HEART RATE: 95 BPM | OXYGEN SATURATION: 96 % | DIASTOLIC BLOOD PRESSURE: 77 MMHG | BODY MASS INDEX: 34.21 KG/M2 | TEMPERATURE: 98.7 F

## 2021-02-11 DIAGNOSIS — R25.1 OCCASIONAL TREMORS: Primary | ICD-10-CM

## 2021-02-11 PROCEDURE — 99214 OFFICE O/P EST MOD 30 MIN: CPT | Performed by: STUDENT IN AN ORGANIZED HEALTH CARE EDUCATION/TRAINING PROGRAM

## 2021-02-11 NOTE — PROGRESS NOTES
Vladimir Patterson is a 28 y.o.  male and presents with    Chief Complaint   Patient presents with    Tremors       Subjective:    Patient continues to complain since last visit of tremors. States that this feels like they are getting worse. He does have occasions when the tremors impede him even from opening a bottle of water because he will spill this everywhere. Patient does not know his family history. States that he was adopted. He does know from his mother said that she was drinking alcohol heavily until he was 7 months old. Previously diagnosed with alcohol fetal syndrome. Patient also has a history of MVP which she sees Dr. Sonia Apple, cardiology. States that it is time for him to see him again. Patient Active Problem List   Diagnosis Code    Mitral valve prolapse I34.1    Chronic atrial fibrillation (HCC) I48.20    Mitral regurgitation I34.0    Fetal alcohol syndrome Q86.0      Past Medical History:   Diagnosis Date    Fetal alcohol syndrome     Mitral regurgitation     MVP (mitral valve prolapse)       Past Surgical History:   Procedure Laterality Date    HX TONSIL AND ADENOIDECTOMY        History reviewed. No pertinent family history.   Social History     Socioeconomic History    Marital status:      Spouse name: Not on file    Number of children: Not on file    Years of education: Not on file    Highest education level: Not on file   Occupational History    Not on file   Social Needs    Financial resource strain: Not on file    Food insecurity     Worry: Not on file     Inability: Not on file    Transportation needs     Medical: Not on file     Non-medical: Not on file   Tobacco Use    Smoking status: Former Smoker     Types: Cigarettes     Start date:      Quit date: 2019     Years since quittin.1    Smokeless tobacco: Never Used   Substance and Sexual Activity    Alcohol use: Yes     Frequency: 2-4 times a month     Drinks per session: 3 or 4 Binge frequency: Never    Drug use: Never    Sexual activity: Yes     Partners: Female   Lifestyle    Physical activity     Days per week: Not on file     Minutes per session: Not on file    Stress: Not on file   Relationships    Social connections     Talks on phone: Not on file     Gets together: Not on file     Attends Confucianist service: Not on file     Active member of club or organization: Not on file     Attends meetings of clubs or organizations: Not on file     Relationship status: Not on file    Intimate partner violence     Fear of current or ex partner: Not on file     Emotionally abused: Not on file     Physically abused: Not on file     Forced sexual activity: Not on file   Other Topics Concern    Not on file   Social History Narrative    Not on file        Current Outpatient Medications   Medication Sig Dispense Refill    aspirin delayed-release 81 mg tablet Take  by mouth daily.  metoprolol succinate (Toprol XL) 25 mg XL tablet Take  by mouth daily. ROS   Review of Systems   Constitutional: Negative for chills, fever and malaise/fatigue. HENT: Negative for congestion, ear discharge and ear pain. Eyes: Negative for blurred vision, pain and discharge. Respiratory: Negative for cough and shortness of breath. Cardiovascular: Negative for chest pain and palpitations. Gastrointestinal: Negative for abdominal pain, nausea and vomiting. Genitourinary: Negative for dysuria, frequency and urgency. Skin: Negative for itching and rash. Neurological: Positive for tremors. Negative for dizziness, seizures, loss of consciousness and headaches. Psychiatric/Behavioral: Negative for substance abuse.            Objective:  Vitals:    02/11/21 1337   BP: 134/77   Pulse: 95   Resp: 19   Temp: 98.7 °F (37.1 °C)   TempSrc: Temporal   SpO2: 96%   Weight: 252 lb 9.6 oz (114.6 kg)   Height: 6' (1.829 m)   PainSc:   0 - No pain       Physical Exam  Constitutional:       General: He is not in acute distress. Appearance: Normal appearance. He is not ill-appearing. HENT:      Nose: Nose normal. No congestion or rhinorrhea. Eyes:      General:         Right eye: No discharge. Left eye: No discharge. Conjunctiva/sclera: Conjunctivae normal.   Neck:      Musculoskeletal: Normal range of motion and neck supple. Cardiovascular:      Rate and Rhythm: Tachycardia present. Rhythm irregular. Pulses: Normal pulses. Heart sounds: Murmur present. Pulmonary:      Effort: Pulmonary effort is normal.      Breath sounds: Normal breath sounds. Musculoskeletal: Normal range of motion. Skin:     General: Skin is warm. Neurological:      Mental Status: He is alert and oriented to person, place, and time. GCS: GCS eye subscore is 4. GCS verbal subscore is 5. GCS motor subscore is 6. Cranial Nerves: Cranial nerves are intact. Sensory: Sensation is intact. Motor: Tremor present. No weakness, atrophy, abnormal muscle tone, seizure activity or pronator drift. Coordination: Coordination is intact. Gait: Gait is intact. Deep Tendon Reflexes: Reflexes are normal and symmetric. Psychiatric:         Mood and Affect: Mood normal.         Behavior: Behavior normal.         Thought Content: Thought content normal.         Judgment: Judgment normal.           LABS     TESTS      Assessment/Plan:    1. Occasional tremors  We will rule out Shan's disease and refer patient to neurology. - COPPER; Future  - HEPATIC FUNCTION PANEL; Future  - COPPER, UR; Future  - REFERRAL TO NEUROLOGY       Lab review: orders written for new lab studies as appropriate; see orders    On this date 2/11/2021 I have spent 30 minutes reviewing previous notes, test results and face to face (virtual) with the patient discussing the diagnosis and importance of compliance with the treatment plan as well as documenting on the day of the visit.     I have discussed the diagnosis with the patient and the intended plan as seen in the above orders. The patient has received an after-visit summary and questions were answered concerning future plans. I have discussed medication side effects and warnings with the patient as well. I have reviewed the plan of care with the patient, accepted their input and they are in agreement with the treatment goals.          Ryan Tan MD

## 2021-02-12 LAB
A-G RATIO,AGRAT: 2.7 RATIO (ref 1.1–2.6)
ALBUMIN SERPL-MCNC: 5.1 G/DL (ref 3.5–5)
ALP SERPL-CCNC: 72 U/L (ref 25–115)
ALT SERPL-CCNC: 92 U/L (ref 5–40)
AST SERPL W P-5'-P-CCNC: 19 U/L (ref 10–37)
BILIRUB SERPL-MCNC: 1.3 MG/DL (ref 0.2–1.2)
BILIRUBIN, DIRECT,CBIL: 0.3 MG/DL (ref 0–0.3)
GLOBULIN,GLOB: 1.9 G/DL (ref 2–4)
PROT SERPL-MCNC: 7 G/DL (ref 6.4–8.3)

## 2021-02-15 LAB
COPPER/CREATININE RATIO: 9 UG/G CREAT (ref 0–49)
Lab: 2.49 G/L (ref 0.3–3)
Lab: 23 UG/L

## 2021-02-17 LAB — COPPER SERUM, 966: 103 UG/DL (ref 69–132)

## 2021-02-18 NOTE — PROGRESS NOTES
Discussed with patient over the phone that he was negative for vasile disease, hepatic liver might show DUFFY. Discussed lifestyle changes. Verbalized understanding.

## 2021-04-15 ENCOUNTER — OFFICE VISIT (OUTPATIENT)
Dept: CARDIOLOGY CLINIC | Age: 36
End: 2021-04-15
Payer: COMMERCIAL

## 2021-04-15 VITALS
SYSTOLIC BLOOD PRESSURE: 138 MMHG | DIASTOLIC BLOOD PRESSURE: 86 MMHG | HEIGHT: 72 IN | WEIGHT: 253 LBS | HEART RATE: 82 BPM | BODY MASS INDEX: 34.27 KG/M2 | OXYGEN SATURATION: 96 % | RESPIRATION RATE: 18 BRPM | TEMPERATURE: 97.5 F

## 2021-04-15 DIAGNOSIS — I48.0 PAF (PAROXYSMAL ATRIAL FIBRILLATION) (HCC): Primary | ICD-10-CM

## 2021-04-15 DIAGNOSIS — I34.1 MVP (MITRAL VALVE PROLAPSE): ICD-10-CM

## 2021-04-15 PROCEDURE — 99213 OFFICE O/P EST LOW 20 MIN: CPT | Performed by: INTERNAL MEDICINE

## 2021-04-15 NOTE — PROGRESS NOTES
[facility]    Mr. Dimitris Decker is 28 y.o. male with a history of mitral valve prolapse, mitral regurgitation and atrial fibrillation. Patient is here today for follow-up appointment. He denies any symptoms to suggest angina or heart failure. He has slight fatigue however other than he has no symptoms that is concerning for fluid overload  Denies PND or lower extremity swelling. He is taking aspirin and metoprolol as prescribed    Past Medical History:   Diagnosis Date    Fetal alcohol syndrome     Mitral regurgitation     MVP (mitral valve prolapse)          Past Surgical History:   Procedure Laterality Date    HX TONSIL AND ADENOIDECTOMY         Current Outpatient Medications   Medication Sig    aspirin (ASPIRIN) 325 mg tablet Take 325 mg by mouth daily.  metoprolol succinate (Toprol XL) 25 mg XL tablet Take  by mouth daily. No current facility-administered medications for this visit. Allergies and Sensitivities:  Allergies   Allergen Reactions    Codeine Unknown (comments)    Novahistine [Chlorpheniramine-Phenylephrine] Unknown (comments)       Family History:  No family history on file. Social History:  Social History     Tobacco Use    Smoking status: Former Smoker     Types: Cigarettes     Start date:      Quit date: 2019     Years since quittin.2    Smokeless tobacco: Never Used   Substance Use Topics    Alcohol use: Yes     Frequency: 2-4 times a month     Drinks per session: 3 or 4     Binge frequency: Never    Drug use: Never     He  reports that he quit smoking about 2 years ago. His smoking use included cigarettes. He started smoking about 13 years ago. He has never used smokeless tobacco.  He  reports current alcohol use. Review of Systems:  Cardiac symptoms as noted above in HPI. All others negative.     Physical Exam:  BP Readings from Last 3 Encounters:   04/15/21 138/86   21 134/77 08/12/20 118/73         Pulse Readings from Last 3 Encounters:   04/15/21 82   02/11/21 95   08/12/20 96          Wt Readings from Last 3 Encounters:   04/15/21 253 lb (114.8 kg)   02/11/21 252 lb 9.6 oz (114.6 kg)   08/12/20 244 lb 6.4 oz (110.9 kg)       Constitutional: Oriented to person, place, and time. HENT: Head: Normocephalic and atraumatic. Neck: No JVD present. Carotid bruit is not appreciated. Cardiovascular: Regular rhythm   no gallop or rubs appreciated. Systolic  murmur on LSB and apex   Lung: Breath sounds normal. No respiratory distress. No ronchi or rales appreciated  Abdominal: No tenderness. No rebound and no guarding. Musculoskeletal: There is no lower extremity edema. No cynosis      Review of Data  LABS:   Lab Results   Component Value Date/Time    Sodium 139 06/23/2020 12:00 AM    Potassium 3.8 06/23/2020 12:00 AM    Chloride 103 06/23/2020 12:00 AM    CO2 20 06/23/2020 12:00 AM    Glucose 99 06/23/2020 12:00 AM    BUN 7 06/23/2020 12:00 AM    Creatinine 0.79 06/23/2020 12:00 AM     No flowsheet data found. Lab Results   Component Value Date/Time    ALT (SGPT) 92 (H) 02/11/2021 02:10 PM     No results found for: HBA1C, HGBE8, OFM7CVAU, UYM6WPVM, CBV4QYOB    EKG  (01/19) sinus rhythm at 64 bpm.  Nonspecific T wave changes. Otherwise normal EKG  (3/20) A.fib    ECHO (07/2020)  Left Ventricle Normal cavity size, wall thickness and systolic function (ejection fraction normal). The estimated ejection fraction is 55 - 60%. Visually measured ejection fraction. LV wall motion is noted to be no regional wall motion abnormality. Wall Scoring The left ventricular wall motion is normal.            Left Atrium Normal cavity size. No atrial septal defect present. No patent foramen ovale visualized. There is no thrombus. There is no thrombus within the left atrial appendage. Appendage velocity is reduced (less than 40 cm/sec). Right Ventricle Normal cavity size and global systolic function. Right Atrium Normal cavity size. Aortic Valve Normal valve structure, no stenosis and no regurgitation. Mitral Valve No stenosis. Mitral valve thickening. There is posterior leaflet thickening. Moderate mitral valve prolapse of the posterior leaflet. Posterior leaflet scallops affected include the middle scallop (P2). Moderate regurgitation. There appears to be two different jet. One central and one anteriorly directed. Tricuspid Valve Normal valve structure and no stenosis. Trace regurgitation. Pulmonic Valve Normal valve structure, no stenosis and no regurgitation. Mr. Julius Jerry is 28 y.o. male with no significant past medical history except possible fetal alcohol syndrome baby    Mitral valve prolapse /mitral regurgitation:  Patient had a echocardiogram in January 2019 which showed posterior leaflet prolapse of mitral valve along with possible moderate to severe mitral regurgitation. MAIK in June 2020 showed moderate regurgitation in the setting of posterior valve prolapse   Patient has no fluid overload on exam.  In absence of any symptoms, recommend for clinical observation and serial echocardiogram.    atrial fibrillation:  Diagnosed in March 2020  On exam, he is in sinus rhythm. Continue aspirin and beta-blocker. This plan was discussed with patient who is in agreement. Thank you for allowing me to participate in patient care. Please feel free to call me if you have any question or concern. Tate Witt MD  Please note: This document has been produced using voice recognition software. Unrecognized errors in transcription may be present.

## 2021-04-15 NOTE — LETTER
4/15/2021 Patient: Yaritza Artis YOB: 1985 Date of Visit: 4/15/2021 Marval Lesches, MD 
[address] [facility] [address] [id number] [address] [employer] Dear Marval Lesches, MD, Thank you for referring Mr. Breanne Calix to [address] [facility] for evaluation. My notes for this consultation are attached. If you have questions, please do not hesitate to call me. I look forward to following your patient along with you. Sincerely, Xavier Whitt MD

## 2021-04-15 NOTE — PROGRESS NOTES
Yves Jeffery presents today for   Chief Complaint   Patient presents with    Follow-up       Yves Jeffery preferred language for health care discussion is english/other. Personal Protective Equipment:   Personal Protective Equipment was used including: mask-surgical and hands-gloves. Patient was placed on no precaution(s). Patient was masked. Precautions:   Patient currently on None  Patient currently roomed with door closed    Is someone accompanying this pt? no    Is the patient using any DME equipment during [address]? no    Depression Screening:  3 most recent PHQ Screens 4/15/2021   Little interest or pleasure in doing things Not at all   Feeling down, depressed, irritable, or hopeless Not at all   Total Score PHQ 2 0       Learning Assessment:  No flowsheet data found. Abuse Screening:  Abuse Screening Questionnaire 2/11/2021   Do you ever feel afraid of your partner? N   Are you in a relationship with someone who physically or mentally threatens you? N   Is it safe for you to go home? Y       Fall Risk  Fall Risk Assessment, last 12 mths 6/23/2020   Able to walk? Yes   Fall in past 12 months? No       Pt currently taking Anticoagulant therapy? no    Coordination of Care:  1. Have you been to the ER, urgent care clinic since your last visit? Hospitalized since your last visit? no    2. Have you seen or consulted any other health care providers outside of the [address] [place] [address] since your last visit? Include any pap smears or colon screening.  no

## 2022-03-01 ENCOUNTER — OFFICE VISIT (OUTPATIENT)
Dept: CARDIOLOGY CLINIC | Age: 37
End: 2022-03-01
Payer: OTHER GOVERNMENT

## 2022-03-01 VITALS
DIASTOLIC BLOOD PRESSURE: 64 MMHG | BODY MASS INDEX: 34.86 KG/M2 | TEMPERATURE: 97.2 F | WEIGHT: 257 LBS | SYSTOLIC BLOOD PRESSURE: 119 MMHG | HEART RATE: 79 BPM | RESPIRATION RATE: 16 BRPM | OXYGEN SATURATION: 97 %

## 2022-03-01 DIAGNOSIS — I34.0 MITRAL VALVE INSUFFICIENCY, UNSPECIFIED ETIOLOGY: Primary | ICD-10-CM

## 2022-03-01 PROCEDURE — 99214 OFFICE O/P EST MOD 30 MIN: CPT | Performed by: INTERNAL MEDICINE

## 2022-03-01 NOTE — PROGRESS NOTES
[facility]    Mr. Sheryl Gates is 39 y.o. male with a history of mitral valve prolapse, mitral regurgitation and atrial fibrillation. Patient is here today for follow-up appointment. Patient admits of sedentary lifestyle and not performing regular exercise. He also admits to unhealthy dietary habits. He has gained almost 15-20 pounds since last visit. He has been feeling slightly more short of breath than usual.  He is able to maintain activity of daily living. He denies any presyncope or syncope. Denies any palpitation or chest pain concerning for angina    Past Medical History:   Diagnosis Date    Fetal alcohol syndrome     Mitral regurgitation     MVP (mitral valve prolapse)        Past Surgical History:   Procedure Laterality Date    HX TONSIL AND ADENOIDECTOMY         Current Outpatient Medications   Medication Sig    aspirin (ASPIRIN) 325 mg tablet Take 325 mg by mouth daily.  metoprolol succinate (Toprol XL) 25 mg XL tablet Take  by mouth daily. No current facility-administered medications for this visit. Allergies and Sensitivities:  Allergies   Allergen Reactions    Codeine Unknown (comments)    Novahistine [Chlorpheniramine-Phenylephrine] Unknown (comments)       Family History:  No family history on file. Social History:  Social History     Tobacco Use    Smoking status: Former Smoker     Types: Cigarettes     Start date: 2008     Quit date: 2019     Years since quitting: 3.1    Smokeless tobacco: Never Used   Substance Use Topics    Alcohol use: Yes    Drug use: Never     He  reports that he quit smoking about 3 years ago. His smoking use included cigarettes. He started smoking about 14 years ago. He has never used smokeless tobacco.  He  reports current alcohol use. Review of Systems:  Cardiac symptoms as noted above in HPI. All others negative.     Physical Exam:  BP Readings from Last 3 Encounters:   03/01/22 119/64   04/15/21 138/86   02/11/21 134/77         Pulse Readings from Last 3 Encounters:   03/01/22 79   04/15/21 82   02/11/21 95          Wt Readings from Last 3 Encounters:   03/01/22 116.6 kg (257 lb)   04/15/21 114.8 kg (253 lb)   02/11/21 114.6 kg (252 lb 9.6 oz)       Constitutional: Oriented to person, place, and time. HENT: Head: Normocephalic and atraumatic. Neck: No JVD present. Carotid bruit is not appreciated. Cardiovascular: Regular rhythm   no gallop or rubs appreciated. Systolic  murmur on LSB and apex   Lung: Breath sounds normal. No respiratory distress. No ronchi or rales appreciated  Abdominal: No tenderness. No rebound and no guarding. Musculoskeletal: There is no lower extremity edema. No cynosis    Review of Data  LABS:   Lab Results   Component Value Date/Time    Sodium 139 06/23/2020 12:00 AM    Potassium 3.8 06/23/2020 12:00 AM    Chloride 103 06/23/2020 12:00 AM    CO2 20 06/23/2020 12:00 AM    Glucose 99 06/23/2020 12:00 AM    BUN 7 06/23/2020 12:00 AM    Creatinine 0.79 06/23/2020 12:00 AM     No flowsheet data found. Lab Results   Component Value Date/Time    ALT (SGPT) 92 (H) 02/11/2021 02:10 PM     No results found for: HBA1C, HCT7DPAT, MME0VQCU, FXH8HXBU    EKG  (01/19) sinus rhythm at 64 bpm.  Nonspecific T wave changes. Otherwise normal EKG  (3/20) A.fib  8/2020: Sinus rhythm     ECHO (07/2020)  Left Ventricle Normal cavity size, wall thickness and systolic function (ejection fraction normal). The estimated ejection fraction is 55 - 60%. Visually measured ejection fraction. LV wall motion is noted to be no regional wall motion abnormality. Wall Scoring The left ventricular wall motion is normal.            Left Atrium Normal cavity size. No atrial septal defect present. No patent foramen ovale visualized. There is no thrombus. There is no thrombus within the left atrial appendage. Appendage velocity is reduced (less than 40 cm/sec).    Right Ventricle Normal cavity size and global systolic function. Right Atrium Normal cavity size. Aortic Valve Normal valve structure, no stenosis and no regurgitation. Mitral Valve No stenosis. Mitral valve thickening. There is posterior leaflet thickening. Moderate mitral valve prolapse of the posterior leaflet. Posterior leaflet scallops affected include the middle scallop (P2). Moderate regurgitation. There appears to be two different jet. One central and one anteriorly directed. Tricuspid Valve Normal valve structure and no stenosis. Trace regurgitation. Pulmonic Valve Normal valve structure, no stenosis and no regurgitation. Mr. Alvaro Levy is 39 y.o. male with no significant past medical history except possible fetal alcohol syndrome baby    Mitral valve prolapse /mitral regurgitation:  Patient had a echocardiogram in January 2019 which showed posterior leaflet prolapse of mitral valve along with possible moderate to severe mitral regurgitation. MAIK in June 2020 showed moderate regurgitation in the setting of posterior valve prolapse   Patient has no fluid overload on exam.    However as patient is feeling slightly short of breath compared to previous visit, we will proceed with echocardiogram to evaluate mitral regurgitation severity    atrial fibrillation:  Diagnosed in March 2020  On exam, he is in sinus rhythm. Continue aspirin and beta-blocker. Patient has gained almost 15 to 20 pounds since last visit. Importance of diet and exercise discussed    This plan was discussed with patient who is in agreement. Thank you for allowing me to participate in patient care. Please feel free to call me if you have any question or concern. Yifan Max MD  Please note: This document has been produced using voice recognition software. Unrecognized errors in transcription may be present.

## 2022-03-01 NOTE — PROGRESS NOTES
Identified pt with two pt identifiers(name and ). Reviewed record in preparation for visit and have obtained necessary documentation. Shell Garcia presents today for   Chief Complaint   Patient presents with    Follow-up     9m               Shell Garcia preferred language for health care discussion is english/other. Personal Protective Equipment:   Personal Protective Equipment was used including: mask-surgical and hands-gloves. Patient was placed on no precaution(s). Patient was masked. Precautions:   Patient currently on None  Patient currently roomed with door closed. Is someone accompanying this pt? no    Is the patient using any DME equipment during [address]? no    Depression Screening:  3 most recent PHQ Screens 4/15/2021   Little interest or pleasure in doing things Not at all   Feeling down, depressed, irritable, or hopeless Not at all   Total Score PHQ 2 0       Learning Assessment:  No flowsheet data found. Abuse Screening:  Abuse Screening Questionnaire 2021   Do you ever feel afraid of your partner? N   Are you in a relationship with someone who physically or mentally threatens you? N   Is it safe for you to go home? Y       Fall Risk  Fall Risk Assessment, last 12 mths 2020   Able to walk? Yes   Fall in past 12 months? No       Pt currently taking Anticoagulant therapy? no  Pt currently taking Antiplatelet therapy? no    Coordination of Care:  1. Have you been to the ER, urgent care clinic since your last visit? Hospitalized since your last visit? no    2. Have you seen or consulted any other health care providers outside of the [address] [place] [address] since your last visit? Include any pap smears or colon screening. yes      Please see Red banners under Allergies and Med Rec to remove outside inquires. All correct information has been verified with patient and added to chart.      Medication's patient's would liked removed has been marked not taking to be removed per Verbal order and read back per Priya Neal MD

## 2022-03-01 NOTE — LETTER
3/1/2022    Patient: Otto Basilio   YOB: 1985   Date of Visit: 3/1/2022     Will Osorio MD  [address] [id number]  [facility] [address]    Dear Will Osorio MD,      Thank you for referring Mr. Portia Francis to [address] [place] [address] for evaluation. My notes for this consultation are attached. If you have questions, please do not hesitate to call me. I look forward to following your patient along with you.       Sincerely,    Donis Song MD

## 2022-03-19 PROBLEM — I48.20 CHRONIC ATRIAL FIBRILLATION (HCC): Status: ACTIVE | Noted: 2020-11-11

## 2022-03-19 PROBLEM — I34.1 MITRAL VALVE PROLAPSE: Status: ACTIVE | Noted: 2020-11-11

## 2022-04-04 ENCOUNTER — TELEPHONE (OUTPATIENT)
Dept: CARDIOLOGY CLINIC | Age: 37
End: 2022-04-04

## 2022-04-05 ENCOUNTER — TELEPHONE (OUTPATIENT)
Dept: CARDIOLOGY CLINIC | Age: 37
End: 2022-04-05

## 2022-04-05 NOTE — TELEPHONE ENCOUNTER
----- Message from Toni Peterson MD sent at 4/5/2022  1:03 PM EDT -----  Please call the patient regarding his abnormal result.   Severe MR   Needs follow up soon    Thanks

## 2022-04-05 NOTE — TELEPHONE ENCOUNTER
Contacted pt at [facility] number. Two patient Identifiers confirmed. Advised pt per Dr Ashu Chavis. Pt verbalized understanding.

## 2022-05-17 ENCOUNTER — OFFICE VISIT (OUTPATIENT)
Dept: CARDIOLOGY CLINIC | Age: 37
End: 2022-05-17
Payer: OTHER GOVERNMENT

## 2022-05-17 VITALS
RESPIRATION RATE: 18 BRPM | SYSTOLIC BLOOD PRESSURE: 127 MMHG | TEMPERATURE: 97.6 F | HEART RATE: 74 BPM | DIASTOLIC BLOOD PRESSURE: 74 MMHG | WEIGHT: 257 LBS | BODY MASS INDEX: 34.86 KG/M2 | OXYGEN SATURATION: 98 %

## 2022-05-17 DIAGNOSIS — I48.0 PAF (PAROXYSMAL ATRIAL FIBRILLATION) (HCC): Primary | ICD-10-CM

## 2022-05-17 PROCEDURE — 93000 ELECTROCARDIOGRAM COMPLETE: CPT | Performed by: INTERNAL MEDICINE

## 2022-05-17 PROCEDURE — 99215 OFFICE O/P EST HI 40 MIN: CPT | Performed by: INTERNAL MEDICINE

## 2022-05-17 RX ORDER — FUROSEMIDE 20 MG/1
20 TABLET ORAL
Qty: 30 TABLET | Refills: 5 | Status: SHIPPED | OUTPATIENT
Start: 2022-05-17 | End: 2022-10-06 | Stop reason: ALTCHOICE

## 2022-05-17 NOTE — PROGRESS NOTES
Identified pt with two pt identifiers(name and ). Reviewed record in preparation for visit and have obtained necessary documentation. Oly Pack presents today for   Chief Complaint   Patient presents with    Cardiac Testing       Pt denies  DIZZINESS, SOB, CHEST PAIN/ PRESSURE, FATIGUE/WEAKNESS, HEADACHES, SWELLING. Oly Pack preferred language for health care discussion is english/other. Personal Protective Equipment:   Personal Protective Equipment was used including: mask-surgical and hands-gloves. Patient was placed on no precaution(s). Patient was masked. Precautions:   Patient currently on None  Patient currently roomed with door closed. Is someone accompanying this pt? no    Is the patient using any DME equipment during [address]? no    Depression Screening:  3 most recent PHQ Screens 4/15/2021   Little interest or pleasure in doing things Not at all   Feeling down, depressed, irritable, or hopeless Not at all   Total Score PHQ 2 0       Learning Assessment:  No flowsheet data found. Abuse Screening:  Abuse Screening Questionnaire 2021   Do you ever feel afraid of your partner? N   Are you in a relationship with someone who physically or mentally threatens you? N   Is it safe for you to go home? Y       Fall Risk  Fall Risk Assessment, last 12 mths 2020   Able to walk? Yes   Fall in past 12 months? No       Pt currently taking Anticoagulant therapy? no  Pt currently taking Antiplatelet therapy? no    Coordination of Care:  1. Have you been to the ER, urgent care clinic since your last visit? Hospitalized since your last visit? no    2. Have you seen or consulted any other health care providers outside of the [address] [place] [address] since your last visit? Include any pap smears or colon screening. no      Please see Red banners under Allergies and Med Rec to remove outside inquires.  All correct information has been verified with patient and added to chart.      Medication's patient's would liked removed has been marked not taking to be removed per Verbal order and read back per Suman Nuñez MD

## 2022-05-17 NOTE — LETTER
5/17/2022    Patient: Mickie Olson   YOB: 1985   Date of Visit: 5/17/2022     Christina Neri MD  [id number] [place] [id number]  [facility] [address]    Dear Christina Neri MD,      Thank you for referring Mr. Sindy Mendoza to [address] [place] [address] for evaluation. My notes for this consultation are attached. If you have questions, please do not hesitate to call me. I look forward to following your patient along with you.       Sincerely,    Ad Antunez MD

## 2022-05-17 NOTE — PROGRESS NOTES
[facility]    Mr. Ignacio Luna is 39 y.o. male with a history of mitral valve prolapse, mitral regurgitation and atrial fibrillation. Patient is here today for follow-up appointment. Patient feels like he has been regaining weight and also retaining fluid. He is feels fatigue and tired. Occasionally fluttering sensation. Denies any chest pain or chest tightness. Occasional edema. Past Medical History:   Diagnosis Date    Fetal alcohol syndrome     Mitral regurgitation     MVP (mitral valve prolapse)        Past Surgical History:   Procedure Laterality Date    HX TONSIL AND ADENOIDECTOMY         Current Outpatient Medications   Medication Sig    aspirin (ASPIRIN) 325 mg tablet Take 325 mg by mouth daily.  metoprolol succinate (Toprol XL) 25 mg XL tablet Take  by mouth daily. No current facility-administered medications for this visit. Allergies and Sensitivities:  Allergies   Allergen Reactions    Codeine Unknown (comments)    Novahistine [Chlorpheniramine-Phenylephrine] Unknown (comments)       Family History:  No family history on file. Social History:  Social History     Tobacco Use    Smoking status: Former Smoker     Types: Cigarettes     Start date: 2008     Quit date: 2019     Years since quitting: 3.3    Smokeless tobacco: Never Used   Substance Use Topics    Alcohol use: Yes    Drug use: Never     He  reports that he quit smoking about 3 years ago. His smoking use included cigarettes. He started smoking about 14 years ago. He has never used smokeless tobacco.  He  reports current alcohol use. Review of Systems:  Cardiac symptoms as noted above in HPI. All others negative.     Physical Exam:  BP Readings from Last 3 Encounters:   04/05/22 131/83   03/01/22 119/64   04/15/21 138/86         Pulse Readings from Last 3 Encounters:   03/01/22 79   04/15/21 82   02/11/21 95          Wt Readings from Last 3 Encounters:   04/05/22 116.6 kg (257 lb)   03/01/22 116.6 kg (257 lb)   04/15/21 114.8 kg (253 lb)       Constitutional: Oriented to person, place, and time. HENT: Head: Normocephalic and atraumatic. Neck: No JVD present. Carotid bruit is not appreciated. Cardiovascular: Irregular rhythm   no gallop or rubs appreciated. Systolic  murmur on LSB and apex   Lung: Breath sounds normal. No respiratory distress. No ronchi or rales appreciated  Abdominal: No tenderness. No rebound and no guarding. Musculoskeletal: There is no lower extremity edema. No cynosis    Review of Data  LABS:   Lab Results   Component Value Date/Time    Sodium 139 06/23/2020 12:00 AM    Potassium 3.8 06/23/2020 12:00 AM    Chloride 103 06/23/2020 12:00 AM    CO2 20 06/23/2020 12:00 AM    Glucose 99 06/23/2020 12:00 AM    BUN 7 06/23/2020 12:00 AM    Creatinine 0.79 06/23/2020 12:00 AM     No flowsheet data found. Lab Results   Component Value Date/Time    ALT (SGPT) 92 (H) 02/11/2021 02:10 PM     No results found for: HBA1C, XMD7EWEC, JWE1RJYC, UQM9YTGT    EKG  (01/19) sinus rhythm at 64 bpm.  Nonspecific T wave changes. Otherwise normal EKG  (3/20) A.fib  8/2020: Sinus rhythm     ECHO (04/2022)  Left Ventricle Left ventricle size is normal. Moderately increased wall thickness. Severe septal thickening. Normal wall motion. Normal left ventricular systolic function with a visually estimated EF of 60 - 65%. Indeterminate diastolic function due to mitral valve disease. Left Atrium Left atrium is severely dilated. Left atrial volume index is severely increased (>48 mL/m2). Right Ventricle Right ventricle is moderately dilated. Normal systolic function. TAPSE is normal.   Right Atrium Right atrium is moderately dilated. Aortic Valve Mild sclerosis of the aortic valve cusp. No regurgitation. No stenosis. Mitral Valve Moderately thickened leaflet, at the posterior leaflet. Moderate leaflet prolapse noted of the posterior leaflet.  Flail posterior leaflet. Severe transvalvular regurgitation with an eccentrically directed jet and may underestimate severity. Moderate visually. PISA calculation ERO 0.8 cm2  Recommend MAIK   No stenosis noted. Tricuspid Valve Valve structure is normal. Trace transvalvular regurgitation. No stenosis noted. Pulmonic Valve The pulmonic valve was not well visualized. Valve structure is normal. Trace transvalvular regurgitation. No stenosis noted. Pulmonary Artery Pulmonary hypertension not present. Mr. Nettie Wynn is 39 y.o. male with no significant past medical history except possible fetal alcohol syndrome baby    Mitral valve prolapse /mitral regurgitation:  Patient had a echocardiogram in January 2019 which showed posterior leaflet prolapse of mitral valve along with possible moderate to severe mitral regurgitation. MAIK in June 2020 showed moderate regurgitation in the setting of posterior valve prolapse   Echo in 04/2022 showed likely severe mitral regurgitation in the setting of prolapse. Considering his symptoms and worsening MR based on transthoracic, I recommend he undergo a better evaluation of mitral valve pathology and anatomy and MR severity before likely consideration of surgical repair. Will start patient on Lasix 20 mg for as needed use for occasional edema  Discuss with patient about MAIK procedure. Risk, benefit and alternatives discussed with patient about procedure. All complication of procedure including but not limited to emergent thoracic surgery, esophageal damage other complication discussed in detail. atrial fibrillation:  Diagnosed in March 2020  On exam, he appears to be in A. fib however EKG today showed sinus rhythm. Continue aspirin and beta-blocker. This plan was discussed with patient who is in agreement. Thank you for allowing me to participate in patient care. Please feel free to call me if you have any question or concern. Aki Lay MD  Please note:  This document has been produced using voice recognition software. Unrecognized errors in transcription may be present.

## 2022-05-17 NOTE — PATIENT INSTRUCTIONS
New Medication/Medication Changes  Lasix 20 mg as needed    **please allow 24-48 hrs for medication to be escribed to pharmacy** If you need any refills on medications please contact your pharmacy so that the request can be escribed to the provider for review. Other [address]          Patient  EP Instructions    Covid Testing-Labs drawn in [facility] [phone]. Covid testing on Monday, Wednesday, Thursday and Friday from 1-3 pm.   If you have and questions regarding directions please contact them at [phone].    1. You are scheduled to have a  MAIK on  TBD , at TBD. Please check in one hour and 30 min prior to testing time. 2. Please go to [facility] and [place] in the outpatient parking lot that is located around to the back of the hospital and enter through the [facility]. Once you enter through the [facility] check in with the  there. The  will either give you directions or assist you in getting to the cath holding area. 3.  You are not to eat or drink anything after midnight the night before your procedure. 4. Please continue to take your medications with a small sip of water on the morning of the procedure . Hold lasix     5. If you are diabetic, do not take your insulin/sugar pill the morning of the procedure. 6. We encourage families to wait in the waiting room on the first floor while the procedure is being done. The Doctor will come out and talk with you as soon as the procedure is over. 7. There is the possibility that you may spend the night in the hospital, depending on the results of the procedure. This will be determined after the procedure is done. 8.   If you or your family have any questions, please call our office Monday-Friday 9:00am         -4:30 pm , at [phone], and ask to speak to one of the nurses.

## 2022-05-20 ENCOUNTER — TRANSCRIBE ORDER (OUTPATIENT)
Dept: CARDIAC CATH/INVASIVE PROCEDURES | Age: 37
End: 2022-05-20

## 2022-05-20 DIAGNOSIS — I48.91 AFIB (HCC): Primary | ICD-10-CM

## 2022-05-25 DIAGNOSIS — Z01.818 PRE-OP TESTING: Primary | ICD-10-CM

## 2022-05-25 DIAGNOSIS — I48.0 PAROXYSMAL ATRIAL FIBRILLATION (HCC): ICD-10-CM

## 2022-05-27 ENCOUNTER — ANESTHESIA EVENT (OUTPATIENT)
Dept: NON INVASIVE DIAGNOSTICS | Age: 37
End: 2022-05-27
Payer: OTHER GOVERNMENT

## 2022-05-27 ENCOUNTER — ANESTHESIA (OUTPATIENT)
Dept: NON INVASIVE DIAGNOSTICS | Age: 37
End: 2022-05-27
Payer: OTHER GOVERNMENT

## 2022-05-27 ENCOUNTER — HOSPITAL ENCOUNTER (OUTPATIENT)
Dept: NON INVASIVE DIAGNOSTICS | Age: 37
Discharge: HOME | End: 2022-05-27
Attending: INTERNAL MEDICINE | Admitting: INTERNAL MEDICINE
Payer: OTHER GOVERNMENT

## 2022-05-27 VITALS
HEART RATE: 72 BPM | RESPIRATION RATE: 31 BRPM | SYSTOLIC BLOOD PRESSURE: 121 MMHG | HEIGHT: 72 IN | BODY MASS INDEX: 33.86 KG/M2 | WEIGHT: 250 LBS | DIASTOLIC BLOOD PRESSURE: 73 MMHG | OXYGEN SATURATION: 98 %

## 2022-05-27 DIAGNOSIS — I48.91 AFIB (HCC): ICD-10-CM

## 2022-05-27 LAB
ANION GAP SERPL CALC-SCNC: 7 MMOL/L (ref 3–18)
ATRIAL RATE: 67 BPM
BUN SERPL-MCNC: 9 MG/DL (ref 7–18)
BUN/CREAT SERPL: 11 (ref 12–20)
CA-I BLD-MCNC: 1.2 MMOL/L (ref 1.12–1.32)
CALCIUM SERPL-MCNC: 9.2 MG/DL (ref 8.5–10.1)
CALCULATED P AXIS, ECG09: 21 DEGREES
CALCULATED T AXIS, ECG11: 13 DEGREES
CHLORIDE BLD-SCNC: 107 MMOL/L (ref 100–108)
CHLORIDE SERPL-SCNC: 108 MMOL/L (ref 100–111)
CO2 SERPL-SCNC: 25 MMOL/L (ref 21–32)
CREAT SERPL-MCNC: 0.84 MG/DL (ref 0.6–1.3)
CREAT UR-MCNC: 0.9 MG/DL (ref 0.6–1.3)
DIAGNOSIS, 93000: NORMAL
ERYTHROCYTE [DISTWIDTH] IN BLOOD BY AUTOMATED COUNT: 12.2 % (ref 11.6–14.5)
GLUCOSE BLD STRIP.AUTO-MCNC: 88 MG/DL (ref 74–106)
GLUCOSE SERPL-MCNC: 93 MG/DL (ref 74–99)
HCT VFR BLD AUTO: 44.2 % (ref 36–48)
HGB BLD-MCNC: 15.9 G/DL (ref 13–16)
INR PPP: 1 (ref 0.8–1.2)
MCH RBC QN AUTO: 30.6 PG (ref 24–34)
MCHC RBC AUTO-ENTMCNC: 36 G/DL (ref 31–37)
MCV RBC AUTO: 85.2 FL (ref 78–100)
NRBC # BLD: 0 K/UL (ref 0–0.01)
NRBC BLD-RTO: 0 PER 100 WBC
P-R INTERVAL, ECG05: 156 MS
PLATELET # BLD AUTO: 157 K/UL (ref 135–420)
PMV BLD AUTO: 12.9 FL (ref 9.2–11.8)
POTASSIUM BLD-SCNC: 3.7 MMOL/L (ref 3.5–5.5)
POTASSIUM SERPL-SCNC: 3.6 MMOL/L (ref 3.5–5.5)
PROTHROMBIN TIME: 14 SEC (ref 11.5–15.2)
Q-T INTERVAL, ECG07: 406 MS
QRS DURATION, ECG06: 88 MS
QTC CALCULATION (BEZET), ECG08: 429 MS
RBC # BLD AUTO: 5.19 M/UL (ref 4.35–5.65)
SODIUM BLD-SCNC: 143 MMOL/L (ref 136–145)
SODIUM SERPL-SCNC: 140 MMOL/L (ref 136–145)
VENTRICULAR RATE, ECG03: 67 BPM
WBC # BLD AUTO: 8.8 K/UL (ref 4.6–13.2)

## 2022-05-27 PROCEDURE — 85027 COMPLETE CBC AUTOMATED: CPT

## 2022-05-27 PROCEDURE — 74011250636 HC RX REV CODE- 250/636: Performed by: ANESTHESIOLOGY

## 2022-05-27 PROCEDURE — 80048 BASIC METABOLIC PNL TOTAL CA: CPT

## 2022-05-27 PROCEDURE — 99153 MOD SED SAME PHYS/QHP EA: CPT

## 2022-05-27 PROCEDURE — 99152 MOD SED SAME PHYS/QHP 5/>YRS: CPT

## 2022-05-27 PROCEDURE — 85610 PROTHROMBIN TIME: CPT

## 2022-05-27 PROCEDURE — 74011000250 HC RX REV CODE- 250: Performed by: ANESTHESIOLOGY

## 2022-05-27 PROCEDURE — 76060000032 HC ANESTHESIA 0.5 TO 1 HR

## 2022-05-27 PROCEDURE — 96374 THER/PROPH/DIAG INJ IV PUSH: CPT

## 2022-05-27 PROCEDURE — 80047 BASIC METABLC PNL IONIZED CA: CPT

## 2022-05-27 PROCEDURE — 01922 ANES N-INVAS IMG/RADJ THER: CPT | Performed by: ANESTHESIOLOGY

## 2022-05-27 PROCEDURE — 93005 ELECTROCARDIOGRAM TRACING: CPT

## 2022-05-27 RX ORDER — PROPOFOL 10 MG/ML
INJECTION, EMULSION INTRAVENOUS AS NEEDED
Status: DISCONTINUED | OUTPATIENT
Start: 2022-05-27 | End: 2022-05-27 | Stop reason: HOSPADM

## 2022-05-27 RX ORDER — SODIUM CHLORIDE 9 MG/ML
INJECTION, SOLUTION INTRAVENOUS
Status: DISCONTINUED | OUTPATIENT
Start: 2022-05-27 | End: 2022-05-27 | Stop reason: HOSPADM

## 2022-05-27 RX ORDER — LIDOCAINE HYDROCHLORIDE 20 MG/ML
INJECTION, SOLUTION EPIDURAL; INFILTRATION; INTRACAUDAL; PERINEURAL AS NEEDED
Status: DISCONTINUED | OUTPATIENT
Start: 2022-05-27 | End: 2022-05-27 | Stop reason: HOSPADM

## 2022-05-27 RX ORDER — PROPOFOL 10 MG/ML
INJECTION, EMULSION INTRAVENOUS
Status: COMPLETED
Start: 2022-05-27 | End: 2022-05-27

## 2022-05-27 RX ORDER — SODIUM CHLORIDE 9 MG/ML
10 INJECTION INTRAMUSCULAR; INTRAVENOUS; SUBCUTANEOUS ONCE
Status: DISCONTINUED | OUTPATIENT
Start: 2022-05-27 | End: 2022-05-27 | Stop reason: HOSPADM

## 2022-05-27 RX ADMIN — PROPOFOL 25 MG: 10 INJECTION, EMULSION INTRAVENOUS at 14:04

## 2022-05-27 RX ADMIN — PROPOFOL 25 MG: 10 INJECTION, EMULSION INTRAVENOUS at 13:55

## 2022-05-27 RX ADMIN — PROPOFOL 25 MG: 10 INJECTION, EMULSION INTRAVENOUS at 14:01

## 2022-05-27 RX ADMIN — PROPOFOL 25 MG: 10 INJECTION, EMULSION INTRAVENOUS at 13:53

## 2022-05-27 RX ADMIN — PROPOFOL 30 MG: 10 INJECTION, EMULSION INTRAVENOUS at 14:12

## 2022-05-27 RX ADMIN — PROPOFOL 25 MG: 10 INJECTION, EMULSION INTRAVENOUS at 13:57

## 2022-05-27 RX ADMIN — PROPOFOL 25 MG: 10 INJECTION, EMULSION INTRAVENOUS at 13:54

## 2022-05-27 RX ADMIN — PROPOFOL 25 MG: 10 INJECTION, EMULSION INTRAVENOUS at 13:59

## 2022-05-27 RX ADMIN — PROPOFOL 20 MG: 10 INJECTION, EMULSION INTRAVENOUS at 14:08

## 2022-05-27 RX ADMIN — PROPOFOL 75 MG: 10 INJECTION, EMULSION INTRAVENOUS at 13:52

## 2022-05-27 RX ADMIN — LIDOCAINE HYDROCHLORIDE 100 MG: 20 INJECTION, SOLUTION EPIDURAL; INFILTRATION; INTRACAUDAL; PERINEURAL at 13:52

## 2022-05-27 RX ADMIN — SODIUM CHLORIDE: 9 INJECTION, SOLUTION INTRAVENOUS at 13:16

## 2022-05-27 NOTE — ROUTINE PROCESS
Cath holding summary     Patient escorted to cath holding from waiting area ambulatory, alert and oriented x 4, voicing no complaints.  Changed into gown and placed on monitor.   NPO since MN.  Lab results, med rec and H&P reviewed on chart.  PIV x 2 inserted without difficulty.

## 2022-05-27 NOTE — H&P
Please see clinic note  for detail. With severe mild regurgitation on transthoracic echo. MAIK has been planned for better evaluation of mitral valve and pathology before considering surgery  I saw and examined patient and confirmed above. No interval change. Labs reviewed. Procedure explained to patient and all risk and benefit discussed with patient. Discuss with patient about AMIK procedure. Risk, benefit and alternatives discussed with patient about procedure. All complication of procedure including but not limited to emergent thoracic surgery, esophageal damage other complication discussed in detail. History and physical has been reviewed.  There have been no significant clinical changes since the completion of the originally dated History and Physical.  Will be using moderate sedation.    ------------------------------------------------------------------------------------------------------------------

## 2022-05-27 NOTE — ANESTHESIA POSTPROCEDURE EVALUATION
* No procedures listed *. MAC    Anesthesia Post Evaluation      Multimodal analgesia: multimodal analgesia used between 6 hours prior to anesthesia start to PACU discharge  Patient location during evaluation: bedside  Patient participation: complete - patient participated  Level of consciousness: awake  Pain management: adequate  Airway patency: patent  Anesthetic complications: no  Cardiovascular status: stable  Respiratory status: acceptable  Hydration status: acceptable  Post anesthesia nausea and vomiting:  controlled      INITIAL Post-op Vital signs:   Vitals Value Taken Time   /73 05/27/22 1507   Temp     Pulse 68 05/27/22 1521   Resp 25 05/27/22 1521   SpO2 98 % 05/27/22 1520   Vitals shown include unvalidated device data.

## 2022-05-27 NOTE — DISCHARGE INSTRUCTIONS
Patient Education        Transesophageal Echocardiogram: What to Expect at Home  Your Recovery  A transesophageal echocardiogram is a test to help your doctor look at the inside of your heart. A small device called a transducer directs sound waves toward your heart. The sound waves make a picture of the heart's valves and chambers. Before the test, your throat was sprayed with medicine to numb it. Your throat may be sore for a few days. You may have had a sedative to help you relax. You may be unsteady after having sedation. It can take a few hours for the medicine's effects to wear off. Common side effects include nausea, vomiting, and feeling sleepy or tired. This care sheet gives you a general idea about how long it will take for you to recover. But each person recovers at a different pace. Follow the steps below to feel better as quickly as possible. How can you care for yourself at home? Activity    · If a sedative was used, your doctor will tell you when it is safe for you to do your normal activities.     · For your safety, do not drive or operate any machinery that could be dangerous. Wait until the medicine wears off and you can think clearly and react easily. Diet    · Do not eat or drink until the numbness in your throat wears off.     · When the numbness is gone, you can eat your normal diet.     · Throat lozenges and warm saltwater gargles can help relieve throat soreness. Throat lozenges can be used by people age 3 or older. And most people can gargle at age 6 and older.     · Do not drink alcohol for 24 hours. Follow-up care is a key part of your treatment and safety. Be sure to make and go to all appointments, and call your doctor if you are having problems. It's also a good idea to know your test results and keep a list of the medicines you take. When should you call for help? Call 911 anytime you think you may need emergency care.  For example, call if:    · Your stools are maroon or very bloody.     · You vomit blood or what looks like coffee grounds. Call your doctor now or seek immediate medical care if:    · You have pain in your chest, belly, or back.     · You have new or worse trouble swallowing.     · You have trouble breathing. Watch closely for changes in your health, and be sure to contact your doctor if you have any problems. Current as of: January 10, 2022               Content Version: 13.2  © 2006-2022 [employer]. Care instructions adapted under license by [employer] (which disclaims liability or warranty for this information). If you have questions about a medical condition or this instruction, always ask your healthcare professional. [place] [address] any warranty or liability for your use of this information. DISCHARGE SUMMARY from Nurse    PATIENT INSTRUCTIONS:    After general anesthesia or intravenous sedation, for 24 hours or while taking prescription Narcotics:  · Limit your activities  · Do not drive and operate hazardous machinery  · Do not make important personal or business decisions  · Do  not drink alcoholic beverages  · If you have not urinated within 8 hours after discharge, please contact your surgeon on call. Report the following to your surgeon:  · Excessive pain, swelling, redness or odor of or around the surgical area  · Temperature over 100.5  · Nausea and vomiting lasting longer than 4 hours or if unable to take medications  · Any signs of decreased circulation or nerve impairment to extremity: change in color, persistent  numbness, tingling, coldness or increase pain  · Any questions    What to do at Home:  Recommended activity: Activity as tolerated and no driving for today. *  Please give a list of your current medications to your Primary Care Provider.     *  Please update this list whenever your medications are discontinued, doses are      changed, or new medications (including over-the-counter products) are added. *  Please carry medication information at all times in case of emergency situations. These are general instructions for a healthy lifestyle:    No smoking/ No tobacco products/ Avoid exposure to second hand smoke  Surgeon General's Warning:  Quitting smoking now greatly reduces serious risk to your health. Obesity, smoking, and sedentary lifestyle greatly increases your risk for illness    A healthy diet, regular physical exercise & weight monitoring are important for maintaining a healthy lifestyle    You may be retaining fluid if you have a history of heart failure or if you experience any of the following symptoms:  Weight gain of 3 pounds or more overnight or 5 pounds in a week, increased swelling in our hands or feet or shortness of breath while lying flat in bed. Please call your doctor as soon as you notice any of these symptoms; do not wait until your next office visit. The discharge information has been reviewed with the patient. The patient verbalized understanding. Discharge medications reviewed with the patient and appropriate educational materials and side effects teaching were provided.   ___________________________________________________________________________________________________________________________________

## 2022-05-27 NOTE — PROGRESS NOTES
Anesthesia name:  Kenneth Feliz     Anesthesia is present for case. Refer to anesthesia log for vitals.

## 2022-05-27 NOTE — ANESTHESIA PREPROCEDURE EVALUATION
Relevant Problems   CARDIOVASCULAR   (+) Chronic atrial fibrillation (HCC)   (+) Mitral regurgitation   (+) Mitral valve prolapse       Anesthetic History   No history of anesthetic complications            Review of Systems / Medical History  Patient summary reviewed and pertinent labs reviewed    Pulmonary                Comments: Quit smoking 2 yr ago   Neuro/Psych   Within defined limits           Cardiovascular      Valvular problems/murmurs: mitral insufficiency      Dysrhythmias : atrial fibrillation      Exercise tolerance: >4 METS     GI/Hepatic/Renal                Endo/Other        Obesity     Other Findings              Physical Exam    Airway  Mallampati: III  TM Distance: 4 - 6 cm  Neck ROM: normal range of motion   Mouth opening: Diminished (comment)     Cardiovascular    Rhythm: irregular  Rate: normal         Dental    Dentition: Poor dentition     Pulmonary  Breath sounds clear to auscultation               Abdominal  GI exam deferred       Other Findings            Anesthetic Plan    ASA: 2  Anesthesia type: MAC          Induction: Intravenous  Anesthetic plan and risks discussed with: Patient

## 2022-05-31 LAB
ECHO MV REGURGITANT ALIASING (NYQUIST) VELOCITY: 99 CM/S
ECHO MV REGURGITANT VELOCITY PISA: 4.9 M/S
ECHO MV REGURGITANT VTIA: 132.8 CM

## 2022-05-31 PROCEDURE — 93325 DOPPLER ECHO COLOR FLOW MAPG: CPT | Performed by: INTERNAL MEDICINE

## 2022-05-31 PROCEDURE — 93312 ECHO TRANSESOPHAGEAL: CPT | Performed by: INTERNAL MEDICINE

## 2022-05-31 PROCEDURE — 93320 DOPPLER ECHO COMPLETE: CPT | Performed by: INTERNAL MEDICINE

## 2022-07-19 ENCOUNTER — OFFICE VISIT (OUTPATIENT)
Dept: CARDIOLOGY CLINIC | Age: 37
End: 2022-07-19
Payer: OTHER GOVERNMENT

## 2022-07-19 VITALS — OXYGEN SATURATION: 98 % | TEMPERATURE: 98 F | HEART RATE: 71 BPM | BODY MASS INDEX: 34.72 KG/M2 | WEIGHT: 256 LBS

## 2022-07-19 DIAGNOSIS — I34.0 MITRAL VALVE INSUFFICIENCY, UNSPECIFIED ETIOLOGY: Primary | ICD-10-CM

## 2022-07-19 DIAGNOSIS — I34.1 MVP (MITRAL VALVE PROLAPSE): ICD-10-CM

## 2022-07-19 PROCEDURE — 99214 OFFICE O/P EST MOD 30 MIN: CPT | Performed by: INTERNAL MEDICINE

## 2022-07-19 RX ORDER — PROPRANOLOL HYDROCHLORIDE 20 MG/1
20 TABLET ORAL 2 TIMES DAILY
COMMUNITY
Start: 2022-05-25 | End: 2022-10-12 | Stop reason: SDUPTHER

## 2022-07-19 NOTE — PROGRESS NOTES
Identified pt with two pt identifiers(name and ). Reviewed record in preparation for visit and have obtained necessary documentation. Hector Jackson presents today for   Chief Complaint   Patient presents with    Follow-up       Pt denies DIZZINESS, SOB, CHEST PAIN/ PRESSURE, FATIGUE/WEAKNESS, HEADACHES, SWELLING. Hector Jackson preferred language for health care discussion is english/other. Personal Protective Equipment:   Personal Protective Equipment was used including: mask-surgical and hands-gloves. Patient was placed on no precaution(s). Patient was masked. Precautions:   Patient currently on None  Patient currently roomed with door closed. Is someone accompanying this pt? no    Is the patient using any DME equipment during [address]? no    Depression Screening:  3 most recent PHQ Screens 2022   Little interest or pleasure in doing things Not at all   Feeling down, depressed, irritable, or hopeless Not at all   Total Score PHQ 2 0       Learning Assessment:  No flowsheet data found. Abuse Screening:  Abuse Screening Questionnaire 2021   Do you ever feel afraid of your partner? N   Are you in a relationship with someone who physically or mentally threatens you? N   Is it safe for you to go home? Y       Fall Risk  Fall Risk Assessment, last 12 mths 2020   Able to walk? Yes   Fall in past 12 months? No       Pt currently taking Anticoagulant therapy? no  Pt currently taking Antiplatelet therapy? no    Coordination of Care:  1. Have you been to the ER, urgent care clinic since your last visit? Hospitalized since your last visit? no    2. Have you seen or consulted any other health care providers outside of the [address] [place] [address] since your last visit? Include any pap smears or colon screening. no      Please see Red banners under Allergies and Med Rec to remove outside inquires.  All correct information has been verified with patient and added to chart.     Medication's patient's would liked removed has been marked not taking to be removed per Verbal order and read back per Pastor Staples MD

## 2022-07-19 NOTE — PROGRESS NOTES
[facility]    Mr. Deysi Vizcarra is 39 y.o. male with a history of mitral valve prolapse, mitral regurgitation and atrial fibrillation. Patient is here today for follow-up appointment. Patient has undergone MAIK since last visit. Patient continues to have some fatigue and dyspnea. Occasional swelling. Denies any chest pain or chest tightness. No palpitation, presyncope or syncope    Past Medical History:   Diagnosis Date    Fetal alcohol syndrome     Mitral regurgitation     MVP (mitral valve prolapse)        Past Surgical History:   Procedure Laterality Date    HX TONSIL AND ADENOIDECTOMY         Current Outpatient Medications   Medication Sig    furosemide (LASIX) 20 mg tablet Take 1 Tablet by mouth daily as needed (swelling). (Patient not taking: Reported on 5/27/2022)    aspirin (ASPIRIN) 325 mg tablet Take 325 mg by mouth daily.  metoprolol succinate (Toprol XL) 25 mg XL tablet Take  by mouth daily. No current facility-administered medications for this visit. Allergies and Sensitivities:  Allergies   Allergen Reactions    Codeine Unknown (comments)    Novahistine [Chlorpheniramine-Phenylephrine] Unknown (comments)       Family History:  No family history on file. Social History:  Social History     Tobacco Use    Smoking status: Former Smoker     Types: Cigarettes     Start date: 2008     Quit date: 2019     Years since quitting: 3.5    Smokeless tobacco: Never Used   Substance Use Topics    Alcohol use: Yes    Drug use: Never     He  reports that he quit smoking about 3 years ago. His smoking use included cigarettes. He started smoking about 14 years ago. He has never used smokeless tobacco.  He  reports current alcohol use. Review of Systems:  Cardiac symptoms as noted above in HPI. All others negative.     Physical Exam:  BP Readings from Last 3 Encounters:   05/27/22 121/73   05/17/22 127/74   04/05/22 131/83         Pulse Readings from Last 3 Encounters:   05/27/22 72   05/17/22 74   03/01/22 79          Wt Readings from Last 3 Encounters:   05/27/22 113.4 kg (250 lb)   05/17/22 116.6 kg (257 lb)   04/05/22 116.6 kg (257 lb)       Constitutional: Oriented to person, place, and time. HENT: Head: Normocephalic and atraumatic. Neck: No JVD present. Carotid bruit is not appreciated. Cardiovascular: Irregular rhythm   no gallop or rubs appreciated. Systolic  murmur on LSB and apex   Lung: Breath sounds normal. No respiratory distress. No ronchi or rales appreciated  Abdominal: No tenderness. No rebound and no guarding. Musculoskeletal: There is no lower extremity edema. No cynosis    Review of Data  LABS:   Lab Results   Component Value Date/Time    Sodium 140 05/27/2022 01:00 PM    Potassium 3.6 05/27/2022 01:00 PM    Chloride 108 05/27/2022 01:00 PM    CO2 25 05/27/2022 01:00 PM    Glucose 93 05/27/2022 01:00 PM    BUN 9 05/27/2022 01:00 PM    Creatinine 0.84 05/27/2022 01:00 PM     No flowsheet data found. Lab Results   Component Value Date/Time    ALT (SGPT) 92 (H) 02/11/2021 02:10 PM     No results found for: HBA1C, ZVQ1DUWH, RKM0GKKM, OMQ3ABVC    EKG  (01/19) sinus rhythm at 64 bpm.  Nonspecific T wave changes. Otherwise normal EKG  (3/20) A.fib  8/2020: Sinus rhythm     ECHO (04/2022)  Left Ventricle Left ventricle size is normal. Moderately increased wall thickness. Severe septal thickening. Normal wall motion. Normal left ventricular systolic function with a visually estimated EF of 60 - 65%. Indeterminate diastolic function due to mitral valve disease. Left Atrium Left atrium is severely dilated. Left atrial volume index is severely increased (>48 mL/m2). Right Ventricle Right ventricle is moderately dilated. Normal systolic function. TAPSE is normal.   Right Atrium Right atrium is moderately dilated. Aortic Valve Mild sclerosis of the aortic valve cusp. No regurgitation. No stenosis. Mitral Valve Moderately thickened leaflet, at the posterior leaflet. Moderate leaflet prolapse noted of the posterior leaflet. Flail posterior leaflet. Severe transvalvular regurgitation with an eccentrically directed jet and may underestimate severity. Moderate visually. PISA calculation ERO 0.8 cm2  Recommend MAIK   No stenosis noted. Tricuspid Valve Valve structure is normal. Trace transvalvular regurgitation. No stenosis noted. Pulmonic Valve The pulmonic valve was not well visualized. Valve structure is normal. Trace transvalvular regurgitation. No stenosis noted. Pulmonary Artery Pulmonary hypertension not present. Mr. Jacob Goldman is 39 y.o. male with no significant past medical history except possible fetal alcohol syndrome baby    Mitral valve prolapse /mitral regurgitation:  Patient had a echocardiogram in January 2019 which showed posterior leaflet prolapse of mitral valve along with possible moderate to severe mitral regurgitation. MAIK in June 2020 showed moderate regurgitation in the setting of posterior valve prolapse   Echo in 04/2022 showed likely severe mitral regurgitation in the setting of prolapse. MAIK 05/2022, Normal EF, mod-severe MR, prolapse and flail leaflet. Considering fatigue tiredness, dyspnea and occasional edema, recommend surgical evaluation for mitral valve repair. Discussed with patient different options. After discussion, he would like to consider [facility] for evaluation. I have reached out to surgery team there and they will call him with appointment. Use Lasix 20 mg as needed. atrial fibrillation:  Diagnosed in March 2020  On exam, he appears to be in sinus rhythm. Continue aspirin and beta-blocker. This plan was discussed with patient who is in agreement. Thank you for allowing me to participate in patient care. Please feel free to call me if you have any question or concern. Didier Arcos MD  Please note:  This document has been produced using voice recognition software. Unrecognized errors in transcription may be present.

## 2022-07-19 NOTE — LETTER
7/19/2022    Patient: Vladimir Patterson   YOB: 1985   Date of Visit: 7/19/2022     Yordan Finley MD  [facility] [address] [id number]  Via In Basket    Dear Yordan Finley MD,      Thank you for referring Mr. Blanca Sin to [address] [place] [address] for evaluation. My notes for this consultation are attached. If you have questions, please do not hesitate to call me. I look forward to following your patient along with you.       Sincerely,    Sourav Meier MD

## 2022-07-21 ENCOUNTER — TELEPHONE (OUTPATIENT)
Dept: CARDIOLOGY CLINIC | Age: 37
End: 2022-07-21

## 2022-07-21 NOTE — TELEPHONE ENCOUNTER
[facility] callled to get additonal info on the patient and needs him to have a left heart cath before they can move forward.  [phone] Patrizia

## 2022-07-21 NOTE — TELEPHONE ENCOUNTER
Attempted to contact  at number, no answer. Lvm for pt to return call to office at [phone]. Will continue to try to contact.       Re: Pt needs to get [facility] to assure they will [facility] cost prior to scheduling ; pt is aware and stated he would get approval from [place] prior to any appt

## 2022-07-22 NOTE — TELEPHONE ENCOUNTER
Attempted to contact [facility] at number, no answer. [facility] for return call to office at [phone]. Will continue to try to contact.

## 2022-07-25 NOTE — TELEPHONE ENCOUNTER
Incoming from [employer]. Two patient Identifiers confirmed. Advised Patrizia per notes below. Patrizia verbalized understanding and stated she would reach out to patient.

## 2022-08-15 ENCOUNTER — TELEPHONE (OUTPATIENT)
Dept: CARDIOLOGY CLINIC | Age: 37
End: 2022-08-15

## 2022-08-16 ENCOUNTER — TELEPHONE (OUTPATIENT)
Dept: CARDIOLOGY CLINIC | Age: 37
End: 2022-08-16

## 2022-08-16 NOTE — TELEPHONE ENCOUNTER
Patrizia from [facility] calling back because she was wanting to know if we had heard anything from Mr. Murphy Will regarding him res[facility]mitting [facility] work to  if he will be covered to have a left heart cath so he can move forward with scheduling appt with the cardiothoracic surgeon at [facility]

## 2022-08-16 NOTE — TELEPHONE ENCOUNTER
Patient now calling because he has been in contact with the [place] and he said that they have not received any information from our office.  Once this gets sent to the [facility] patient needs left heart cath in order to move forward with cardiothoracic     [place] fax number: [phone]

## 2022-08-17 NOTE — TELEPHONE ENCOUNTER
Patient calling back to see if we had heard anything from the [place].  States that if not resolved soon his procedure is getting pushed back

## 2022-08-22 NOTE — TELEPHONE ENCOUNTER
Gloria Gold is a 55 y o   female here 6 weeks post LAVH bilateral salpingectomy  Patient was generally healing significantly improved at her last visit however since that time she has had multiple episodes where sometimes she is doing no activity but will suddenly get hot and flushed and slightly lightheaded she is still having some significant fatigue and also notice some mood swings  Patient otherwise feels that she is recovering physically well she is tolerating regular diet voiding and bowel without difficulties her appetite is back to normal and while she has some discomfort she does not require pain medication  Patient had some symptoms around the time last week where she perhaps would have been due for her menses she had some breast tenderness and these episodic lightheaded flushing episodes were decreased  Reviewed with patient that she may unfortunately be experiencing vasovagal hot flashes  Reviewed with patient checking some lab work and discussed options to treat including but not limited to omega-3, black cohosh, SSRI, hormones  Gynecologic History  Patient's last menstrual period was 2022  Contraception: status post hysterectomy  Last Pap:  2020  Results were: normal  Last mammogram:  2022  Results were: normal    Obstetric History  OB History    Para Term  AB Living   1 1 1     1   SAB IAB Ectopic Multiple Live Births           1      # Outcome Date GA Lbr Aguila/2nd Weight Sex Delivery Anes PTL Lv   1 Term 2010    F Vag-Spont   DENISE         The following portions of the patient's history were reviewed and updated as appropriate: allergies, current medications, past family history, past medical history, past social history, past surgical history and problem list     Review of Systems  Review of Systems   Constitutional: Negative  Negative for chills and fever  HENT: Negative  Negative for ear pain and sore throat  Eyes: Negative 
[facility] calling because they said they called last week needing the images from the patient MAIK sent to them.      [facility]
Pt will need to have medical release sent to [facility] for MAIK CD.
See [employer] message.  Resolved
Spoke with Patrizia from [facility], advised that pt would have to sign a release of medical records and obtain a CD of images. Stated she would call back and advise.
Waiting for call back
Negative for pain and visual disturbance  Respiratory: Negative  Negative for cough and shortness of breath  Cardiovascular: Positive for palpitations  Gastrointestinal: Positive for abdominal pain  Negative for vomiting  Endocrine: Negative  Genitourinary: Negative  Negative for dysuria and hematuria  Musculoskeletal: Positive for back pain  Negative for arthralgias  Skin: Negative  Negative for color change and rash  Neurological: Positive for dizziness, light-headedness and headaches  Negative for seizures and syncope  Hematological: Negative  Psychiatric/Behavioral: Negative  All other systems reviewed and are negative  Objective     /84 (BP Location: Left arm, Patient Position: Sitting, Cuff Size: Standard)   Ht 5' 2" (1 575 m)   Wt 50 3 kg (111 lb)   LMP 2022   BMI 20 30 kg/m²   General appearance: alert and oriented, in no acute distress  Lungs: clear to auscultation bilaterally  Heart: regular rate and rhythm, S1, S2 normal, no murmur, click, rub or gallop  Abdomen: soft, non-tender; bowel sounds normal; no masses,  no organomegaly and Incisions clean dry intact healing well small stitch and snipped from right lower quadrant site      Assessment  55year-old  status post LAVH bilateral salpingectomy about 6 weeks having some lightheaded and flushing symptoms possibly perimenopausal symptoms  Plan  FSH, LH, CBC diff, TSH, free T4 iron panel  Follow up based on results and symptoms    Consider six-month for annual
1-2 drinks

## 2022-08-23 ENCOUNTER — OFFICE VISIT (OUTPATIENT)
Dept: CARDIOLOGY CLINIC | Age: 37
End: 2022-08-23
Payer: COMMERCIAL

## 2022-08-23 VITALS
SYSTOLIC BLOOD PRESSURE: 126 MMHG | OXYGEN SATURATION: 98 % | WEIGHT: 255 LBS | DIASTOLIC BLOOD PRESSURE: 82 MMHG | TEMPERATURE: 97.8 F | HEART RATE: 66 BPM | BODY MASS INDEX: 34.58 KG/M2

## 2022-08-23 DIAGNOSIS — I48.91 ATRIAL FIBRILLATION, UNSPECIFIED TYPE (HCC): ICD-10-CM

## 2022-08-23 DIAGNOSIS — Z01.818 PRE-OP TESTING: ICD-10-CM

## 2022-08-23 DIAGNOSIS — I10 ESSENTIAL HYPERTENSION WITH GOAL BLOOD PRESSURE LESS THAN 140/90: ICD-10-CM

## 2022-08-23 DIAGNOSIS — I34.0 MITRAL VALVE INSUFFICIENCY, UNSPECIFIED ETIOLOGY: Primary | ICD-10-CM

## 2022-08-23 DIAGNOSIS — I48.0 PAF (PAROXYSMAL ATRIAL FIBRILLATION) (HCC): ICD-10-CM

## 2022-08-23 DIAGNOSIS — R93.1 ABNORMAL ECHOCARDIOGRAM: Primary | ICD-10-CM

## 2022-08-23 DIAGNOSIS — I34.0 MITRAL VALVE INSUFFICIENCY, UNSPECIFIED ETIOLOGY: ICD-10-CM

## 2022-08-23 PROCEDURE — 99215 OFFICE O/P EST HI 40 MIN: CPT | Performed by: INTERNAL MEDICINE

## 2022-08-23 RX ORDER — SODIUM CHLORIDE 0.9 % (FLUSH) 0.9 %
5-40 SYRINGE (ML) INJECTION EVERY 8 HOURS
Status: CANCELLED | OUTPATIENT
Start: 2022-08-23

## 2022-08-23 RX ORDER — ASPIRIN 325 MG
325 TABLET ORAL DAILY
Status: CANCELLED | OUTPATIENT
Start: 2022-08-24

## 2022-08-23 RX ORDER — SODIUM CHLORIDE 0.9 % (FLUSH) 0.9 %
5-40 SYRINGE (ML) INJECTION AS NEEDED
Status: CANCELLED | OUTPATIENT
Start: 2022-08-23

## 2022-08-23 RX ORDER — SODIUM CHLORIDE 9 MG/ML
1000 INJECTION, SOLUTION INTRAVENOUS CONTINUOUS
Status: CANCELLED | OUTPATIENT
Start: 2022-08-23

## 2022-08-23 NOTE — PROGRESS NOTES
Identified pt with two pt identifiers(name and ). Reviewed record in preparation for visit and have obtained necessary documentation. Radha Every presents today for   Chief Complaint   Patient presents with    Follow-up       Pt denies DIZZINESS, SOB, CHEST PAIN/ PRESSURE, FATIGUE/WEAKNESS, HEADACHES, SWELLING. Radha Every preferred language for health care discussion is english/other. Personal Protective Equipment:   Personal Protective Equipment was used including: mask-surgical and hands-gloves. Patient was placed on no precaution(s). Patient was masked. Precautions:   Patient currently on None  Patient currently roomed with door closed. Is someone accompanying this pt? no    Is the patient using any DME equipment during [address]? no    Depression Screening:  3 most recent PHQ Screens 2022   Little interest or pleasure in doing things Not at all   Feeling down, depressed, irritable, or hopeless Not at all   Total Score PHQ 2 0       Learning Assessment:  No flowsheet data found. Abuse Screening:  Abuse Screening Questionnaire 2021   Do you ever feel afraid of your partner? N   Are you in a relationship with someone who physically or mentally threatens you? N   Is it safe for you to go home? Y       Fall Risk  Fall Risk Assessment, last 12 mths 2020   Able to walk? Yes   Fall in past 12 months? No       Pt currently taking Anticoagulant therapy? no  Pt currently taking Antiplatelet therapy? no    Coordination of Care:  1. Have you been to the ER, urgent care clinic since your last visit? Hospitalized since your last visit? no    2. Have you seen or consulted any other health care providers outside of the [address] [place] [address] since your last visit? Include any pap smears or colon screening. no      Please see Red banners under Allergies and Med Rec to remove outside inquires.  All correct information has been verified with patient and added to chart.     Medication's patient's would liked removed has been marked not taking to be removed per Verbal order and read back per Richard Smith MD

## 2022-08-23 NOTE — PROGRESS NOTES
[facility]    Mr. Tate Braga is 40 y.o. male with a history of mitral valve prolapse, mitral regurgitation and atrial fibrillation. Patient is here today for follow-up appointment. Patient has undergone MAIK since last visit. Patient continues to have some fatigue and dyspnea. Occasional swelling. Denies any chest pain or chest tightness. No palpitation, presyncope or syncope    Past Medical History:   Diagnosis Date    Fetal alcohol syndrome     Mitral regurgitation     MVP (mitral valve prolapse)        Past Surgical History:   Procedure Laterality Date    HX TONSIL AND ADENOIDECTOMY         Current Outpatient Medications   Medication Sig    propranoloL (INDERAL) 20 mg tablet Take 20 mg by mouth two (2) times a day. aspirin (ASPIRIN) 325 mg tablet Take 325 mg by mouth daily. furosemide (LASIX) 20 mg tablet Take 1 Tablet by mouth daily as needed (swelling). (Patient not taking: No sig reported)    metoprolol succinate (TOPROL-XL) 25 mg XL tablet Take  by mouth daily. (Patient not taking: No sig reported)     No current facility-administered medications for this visit. Allergies and Sensitivities:  Allergies   Allergen Reactions    Codeine Unknown (comments)    Novahistine [Chlorpheniramine-Phenylephrine] Unknown (comments)       Family History:  No family history on file. Social History:  Social History     Tobacco Use    Smoking status: Former     Types: Cigarettes     Start date: 2008     Quit date: 2019     Years since quitting: 3.6    Smokeless tobacco: Never   Substance Use Topics    Alcohol use: Yes    Drug use: Never     He  reports that he quit smoking about 3 years ago. His smoking use included cigarettes. He started smoking about 14 years ago. He has never used smokeless tobacco.  He  reports current alcohol use. Review of Systems:  Cardiac symptoms as noted above in HPI. All others negative.     Physical Exam:  BP Readings from Last 3 Encounters:   08/23/22 126/82   05/27/22 121/73   05/17/22 127/74         Pulse Readings from Last 3 Encounters:   08/23/22 66   07/19/22 71   05/27/22 72          Wt Readings from Last 3 Encounters:   08/23/22 115.7 kg (255 lb)   07/19/22 116.1 kg (256 lb)   05/27/22 113.4 kg (250 lb)       Constitutional: Oriented to person, place, and time. HENT: Head: Normocephalic and atraumatic. Neck: No JVD present. Carotid bruit is not appreciated. Cardiovascular: Irregular rhythm   no gallop or rubs appreciated. Systolic  murmur on LSB and apex   Lung: Breath sounds normal. No respiratory distress. No ronchi or rales appreciated  Abdominal: No tenderness. No rebound and no guarding. Musculoskeletal: There is no lower extremity edema. No cynosis    Review of Data  LABS:   Lab Results   Component Value Date/Time    Sodium 140 05/27/2022 01:00 PM    Potassium 3.6 05/27/2022 01:00 PM    Chloride 108 05/27/2022 01:00 PM    CO2 25 05/27/2022 01:00 PM    Glucose 93 05/27/2022 01:00 PM    BUN 9 05/27/2022 01:00 PM    Creatinine 0.84 05/27/2022 01:00 PM     No flowsheet data found. Lab Results   Component Value Date/Time    ALT (SGPT) 92 (H) 02/11/2021 02:10 PM     No results found for: HBA1C, VPS8SCXD, WDB2KKUY, MMK1MJJT    EKG  (01/19) sinus rhythm at 64 bpm.  Nonspecific T wave changes. Otherwise normal EKG  (3/20) A.fib  8/2020: Sinus rhythm     ECHO (04/2022)  Left Ventricle Left ventricle size is normal. Moderately increased wall thickness. Severe septal thickening. Normal wall motion. Normal left ventricular systolic function with a visually estimated EF of 60 - 65%. Indeterminate diastolic function due to mitral valve disease. Left Atrium Left atrium is severely dilated. Left atrial volume index is severely increased (>48 mL/m2). Right Ventricle Right ventricle is moderately dilated. Normal systolic function. TAPSE is normal.   Right Atrium Right atrium is moderately dilated. Aortic Valve Mild sclerosis of the aortic valve cusp. No regurgitation. No stenosis. Mitral Valve Moderately thickened leaflet, at the posterior leaflet. Moderate leaflet prolapse noted of the posterior leaflet. Flail posterior leaflet. Severe transvalvular regurgitation with an eccentrically directed jet and may underestimate severity. Moderate visually. PISA calculation ERO 0.8 cm2  Recommend MAIK   No stenosis noted. Tricuspid Valve Valve structure is normal. Trace transvalvular regurgitation. No stenosis noted. Pulmonic Valve The pulmonic valve was not well visualized. Valve structure is normal. Trace transvalvular regurgitation. No stenosis noted. Pulmonary Artery Pulmonary hypertension not present. Mr. Gatito Kelly is 40 y.o. male with no significant past medical history except possible fetal alcohol syndrome baby    Mitral valve prolapse /mitral regurgitation:  Patient had a echocardiogram in January 2019 which showed posterior leaflet prolapse of mitral valve along with moderate to severe mitral regurgitation. MAIK in June 2020 showed moderate regurgitation in the setting of posterior valve prolapse   Echo in 04/2022 showed likely severe mitral regurgitation in the setting of prolapse. MAIK 05/2022, Normal EF, mod-severe MR, prolapse and flail leaflet. Considering fatigue tiredness, dyspnea and occasional edema, recommend surgical evaluation for mitral valve repair. Patient was seen by CT surgery team at [facility] and surgical procedure has been planned to be done on September 8th. Patient tells me that he has been requested to have cardiac catheterization prior to proceeding with valve surgery. Discussed regarding management strategy which includes medical management vs. Ischemia evaluation ( non-invasive vs. Invasive). Risk, benefit and alternatives of each strategy discussed in detail.   Risk, benefit, complication of LHC and possible PCI ( including but not limited to bleeding, vascular trauma requiring surgery,  infection, heart failure, stroke, MI, emergent bypass surgery, severe allergic reactions, kidney failure, dialysis and death ) were discussed with patient and willing to proceed with procedure. Will be using moderate sedation   By stating these are possible risks, this does not exclude the potential for additional risks not named here. Discussed with patient different options. After discussion, he would like to consider [facility] for evaluation. I have reached out to surgery team there and they will call him with appointment. atrial fibrillation:  Diagnosed in March 2020  On exam, he appears to be in sinus rhythm. Continue aspirin and beta-blocker. This plan was discussed with patient who is in agreement. Thank you for allowing me to participate in patient care. Please feel free to call me if you have any question or concern. Nay Bradley MD  Please note: This document has been produced using voice recognition software. Unrecognized errors in transcription may be present.

## 2022-08-23 NOTE — PATIENT INSTRUCTIONS
Instructions    Pre procedure labs(CBC, CMP, PT/INR) to be completed within 3-5 days prior to procedure. Labs drawn in [address] [place] [address]. Their hours of operation are Monday through Friday 7am-3:30 pm.  If you have and questions regarding directions please contact them at [phone]. Patients Name:  Milena Avendano are scheduled to have a left heart catherization on 08/31/2022  at 10:30(Pending auth) Please check in at 9 am.        **YOU WILL NEED SOMEONE TO DRIVE YOU HOME AFTER PROCEDURE. Please go to [facility] and park in the outpatient parking lot that is located around to the back of the hospital and enter through the [facility] building. Once you enter through the [facility] check in with the  there. The  will either give you directions or assist you in getting to the cath holding area. You are not to eat or drink anything after midnight the night before your procedure. Small sips of water to take your medications is ok. If you are diabetic, do not take your insulin/sugar pill the morning of the procedure. MEDICATION INSTRUCTIONS:   Please take your morning medications with the following special instructions:    [x]          Please make sure to take your Blood pressure medication : Metoprolol and propanolol. Hold lasix day of procedure    [x]          Take your Aspirin. We encourage families to wait in the waiting room on the first floor while the procedure is being done. The Doctor will come out and talk with you as soon as the procedure is over. There is the possibility that you may spend the night in the hospital, depending on the results of the procedure. This will be determined after the procedure is done. If angioplasty or stent is planned, you will stay at least one day.     If you or your family have any questions, please call our office Monday -Friday, 9:00 a.m.-4:30 p.m.,  At [phone], and ask to speak to one of the nurses.

## 2022-08-31 ENCOUNTER — HOSPITAL ENCOUNTER (OUTPATIENT)
Age: 37
Setting detail: OUTPATIENT SURGERY
Discharge: HOME | End: 2022-08-31
Attending: INTERNAL MEDICINE | Admitting: INTERNAL MEDICINE
Payer: OTHER GOVERNMENT

## 2022-08-31 VITALS
BODY MASS INDEX: 33.86 KG/M2 | DIASTOLIC BLOOD PRESSURE: 78 MMHG | OXYGEN SATURATION: 94 % | HEIGHT: 72 IN | WEIGHT: 250 LBS | RESPIRATION RATE: 22 BRPM | SYSTOLIC BLOOD PRESSURE: 148 MMHG | HEART RATE: 80 BPM

## 2022-08-31 DIAGNOSIS — R93.1 ABNORMAL ECHOCARDIOGRAM: ICD-10-CM

## 2022-08-31 DIAGNOSIS — I34.1 MITRAL VALVE PROLAPSE: Primary | Chronic | ICD-10-CM

## 2022-08-31 LAB
ANION GAP SERPL CALC-SCNC: 7 MMOL/L (ref 3–18)
BASOPHILS # BLD: 0.1 K/UL (ref 0–0.1)
BASOPHILS NFR BLD: 1 % (ref 0–2)
BUN SERPL-MCNC: 10 MG/DL (ref 7–18)
BUN/CREAT SERPL: 11 (ref 12–20)
CA-I BLD-MCNC: 1.15 MMOL/L (ref 1.12–1.32)
CALCIUM SERPL-MCNC: 9.1 MG/DL (ref 8.5–10.1)
CHLORIDE BLD-SCNC: 105 MMOL/L (ref 100–108)
CHLORIDE SERPL-SCNC: 107 MMOL/L (ref 100–111)
CO2 SERPL-SCNC: 25 MMOL/L (ref 21–32)
CREAT SERPL-MCNC: 0.92 MG/DL (ref 0.6–1.3)
CREAT UR-MCNC: 0.9 MG/DL (ref 0.6–1.3)
DIFFERENTIAL METHOD BLD: ABNORMAL
EOSINOPHIL # BLD: 0.4 K/UL (ref 0–0.4)
EOSINOPHIL NFR BLD: 5 % (ref 0–5)
ERYTHROCYTE [DISTWIDTH] IN BLOOD BY AUTOMATED COUNT: 12.3 % (ref 11.6–14.5)
GLUCOSE BLD STRIP.AUTO-MCNC: 92 MG/DL (ref 74–106)
GLUCOSE SERPL-MCNC: 102 MG/DL (ref 74–99)
HCT VFR BLD AUTO: 43.9 % (ref 36–48)
HGB BLD-MCNC: 15.8 G/DL (ref 13–16)
IMM GRANULOCYTES # BLD AUTO: 0 K/UL (ref 0–0.04)
IMM GRANULOCYTES NFR BLD AUTO: 0 % (ref 0–0.5)
INR PPP: 1 (ref 0.8–1.2)
LYMPHOCYTES # BLD: 3.1 K/UL (ref 0.9–3.6)
LYMPHOCYTES NFR BLD: 37 % (ref 21–52)
MCH RBC QN AUTO: 30.9 PG (ref 24–34)
MCHC RBC AUTO-ENTMCNC: 36 G/DL (ref 31–37)
MCV RBC AUTO: 85.9 FL (ref 78–100)
MONOCYTES # BLD: 0.6 K/UL (ref 0.05–1.2)
MONOCYTES NFR BLD: 7 % (ref 3–10)
NEUTS SEG # BLD: 4.1 K/UL (ref 1.8–8)
NEUTS SEG NFR BLD: 50 % (ref 40–73)
NRBC # BLD: 0 K/UL (ref 0–0.01)
NRBC BLD-RTO: 0 PER 100 WBC
PLATELET # BLD AUTO: 165 K/UL (ref 135–420)
PMV BLD AUTO: 12.6 FL (ref 9.2–11.8)
POTASSIUM BLD-SCNC: 3.8 MMOL/L (ref 3.5–5.5)
POTASSIUM SERPL-SCNC: 3.7 MMOL/L (ref 3.5–5.5)
PROTHROMBIN TIME: 13.7 SEC (ref 11.5–15.2)
RBC # BLD AUTO: 5.11 M/UL (ref 4.35–5.65)
SODIUM BLD-SCNC: 140 MMOL/L (ref 136–145)
SODIUM SERPL-SCNC: 139 MMOL/L (ref 136–145)
WBC # BLD AUTO: 8.4 K/UL (ref 4.6–13.2)

## 2022-08-31 PROCEDURE — 77030029997 HC DEV COM RDL R BND TELE -B: Performed by: INTERNAL MEDICINE

## 2022-08-31 PROCEDURE — 99152 MOD SED SAME PHYS/QHP 5/>YRS: CPT | Performed by: INTERNAL MEDICINE

## 2022-08-31 PROCEDURE — 77030013761 HC KT HRT LFT ANGI -B: Performed by: INTERNAL MEDICINE

## 2022-08-31 PROCEDURE — 74011250637 HC RX REV CODE- 250/637: Performed by: INTERNAL MEDICINE

## 2022-08-31 PROCEDURE — C1894 INTRO/SHEATH, NON-LASER: HCPCS | Performed by: INTERNAL MEDICINE

## 2022-08-31 PROCEDURE — 93458 L HRT ARTERY/VENTRICLE ANGIO: CPT | Performed by: INTERNAL MEDICINE

## 2022-08-31 PROCEDURE — 77030016699 HC CATH ANGI DX INFN1 CARD -A: Performed by: INTERNAL MEDICINE

## 2022-08-31 PROCEDURE — 77030015766: Performed by: INTERNAL MEDICINE

## 2022-08-31 PROCEDURE — 74011000636 HC RX REV CODE- 636: Performed by: INTERNAL MEDICINE

## 2022-08-31 PROCEDURE — C1769 GUIDE WIRE: HCPCS | Performed by: INTERNAL MEDICINE

## 2022-08-31 PROCEDURE — 85025 COMPLETE CBC W/AUTO DIFF WBC: CPT

## 2022-08-31 PROCEDURE — 74011250636 HC RX REV CODE- 250/636: Performed by: INTERNAL MEDICINE

## 2022-08-31 PROCEDURE — 85610 PROTHROMBIN TIME: CPT

## 2022-08-31 PROCEDURE — 77030012597: Performed by: INTERNAL MEDICINE

## 2022-08-31 PROCEDURE — 80047 BASIC METABLC PNL IONIZED CA: CPT

## 2022-08-31 PROCEDURE — 80048 BASIC METABOLIC PNL TOTAL CA: CPT

## 2022-08-31 PROCEDURE — 77030013797 HC KT TRNSDUC PRSSR EDWD -A: Performed by: INTERNAL MEDICINE

## 2022-08-31 PROCEDURE — 2709999900 HC NON-CHARGEABLE SUPPLY: Performed by: INTERNAL MEDICINE

## 2022-08-31 PROCEDURE — 74011000250 HC RX REV CODE- 250: Performed by: INTERNAL MEDICINE

## 2022-08-31 RX ORDER — LIDOCAINE HYDROCHLORIDE 10 MG/ML
INJECTION, SOLUTION EPIDURAL; INFILTRATION; INTRACAUDAL; PERINEURAL AS NEEDED
Status: DISCONTINUED | OUTPATIENT
Start: 2022-08-31 | End: 2022-08-31 | Stop reason: HOSPADM

## 2022-08-31 RX ORDER — MIDAZOLAM HYDROCHLORIDE 1 MG/ML
INJECTION, SOLUTION INTRAMUSCULAR; INTRAVENOUS AS NEEDED
Status: DISCONTINUED | OUTPATIENT
Start: 2022-08-31 | End: 2022-08-31 | Stop reason: HOSPADM

## 2022-08-31 RX ORDER — MIDAZOLAM HYDROCHLORIDE 1 MG/ML
INJECTION, SOLUTION INTRAMUSCULAR; INTRAVENOUS
Status: DISCONTINUED
Start: 2022-08-31 | End: 2022-08-31 | Stop reason: HOSPADM

## 2022-08-31 RX ORDER — HEPARIN SODIUM 200 [USP'U]/100ML
INJECTION, SOLUTION INTRAVENOUS
Status: DISCONTINUED
Start: 2022-08-31 | End: 2022-08-31 | Stop reason: HOSPADM

## 2022-08-31 RX ORDER — VERAPAMIL HYDROCHLORIDE 2.5 MG/ML
INJECTION, SOLUTION INTRAVENOUS AS NEEDED
Status: DISCONTINUED | OUTPATIENT
Start: 2022-08-31 | End: 2022-08-31 | Stop reason: HOSPADM

## 2022-08-31 RX ORDER — LIDOCAINE HYDROCHLORIDE 10 MG/ML
INJECTION, SOLUTION EPIDURAL; INFILTRATION; INTRACAUDAL; PERINEURAL
Status: DISCONTINUED
Start: 2022-08-31 | End: 2022-08-31 | Stop reason: HOSPADM

## 2022-08-31 RX ORDER — FENTANYL CITRATE 50 UG/ML
INJECTION, SOLUTION INTRAMUSCULAR; INTRAVENOUS AS NEEDED
Status: DISCONTINUED | OUTPATIENT
Start: 2022-08-31 | End: 2022-08-31 | Stop reason: HOSPADM

## 2022-08-31 RX ORDER — HEPARIN SODIUM 1000 [USP'U]/ML
INJECTION, SOLUTION INTRAVENOUS; SUBCUTANEOUS AS NEEDED
Status: DISCONTINUED | OUTPATIENT
Start: 2022-08-31 | End: 2022-08-31 | Stop reason: HOSPADM

## 2022-08-31 RX ORDER — HEPARIN SODIUM 1000 [USP'U]/ML
INJECTION, SOLUTION INTRAVENOUS; SUBCUTANEOUS
Status: DISCONTINUED
Start: 2022-08-31 | End: 2022-08-31 | Stop reason: HOSPADM

## 2022-08-31 RX ORDER — GUAIFENESIN 100 MG/5ML
LIQUID (ML) ORAL
Status: DISCONTINUED
Start: 2022-08-31 | End: 2022-08-31 | Stop reason: HOSPADM

## 2022-08-31 RX ORDER — HEPARIN SODIUM 200 [USP'U]/100ML
INJECTION, SOLUTION INTRAVENOUS AS NEEDED
Status: DISCONTINUED | OUTPATIENT
Start: 2022-08-31 | End: 2022-08-31 | Stop reason: HOSPADM

## 2022-08-31 RX ORDER — VERAPAMIL HYDROCHLORIDE 2.5 MG/ML
INJECTION, SOLUTION INTRAVENOUS
Status: DISCONTINUED
Start: 2022-08-31 | End: 2022-08-31 | Stop reason: HOSPADM

## 2022-08-31 RX ORDER — GUAIFENESIN 100 MG/5ML
81 LIQUID (ML) ORAL ONCE
Status: COMPLETED | OUTPATIENT
Start: 2022-08-31 | End: 2022-08-31

## 2022-08-31 RX ORDER — FENTANYL CITRATE 50 UG/ML
INJECTION, SOLUTION INTRAMUSCULAR; INTRAVENOUS
Status: DISCONTINUED
Start: 2022-08-31 | End: 2022-08-31 | Stop reason: HOSPADM

## 2022-08-31 RX ADMIN — ASPIRIN 81 MG CHEWABLE TABLET 81 MG: 81 TABLET CHEWABLE at 09:07

## 2022-08-31 NOTE — PROGRESS NOTES
TRANSFER - OUT REPORT:    Verbal report given to Warner Marquez  being transferred to [address] [place] [address] for ordered procedure       Report consisted of patients Situation, Background, Assessment and   Recommendations(SBAR). Information from the following report(s) SBAR, Procedure Summary, and MAR was reviewed with the receiving nurse. Lines:       Opportunity for questions and clarification was provided.       Patient transported with:   Registered Nurse

## 2022-08-31 NOTE — PROGRESS NOTES
TRANSFER - IN REPORT:    Verbal report received from [employer]  on Jeneal Rockford  being received from [address] [place] [address] for routine post - op      Report consisted of patients Situation, Background, Assessment and   Recommendations(SBAR). Information from the following report(s) SBAR, Procedure Summary, and MAR was reviewed with the receiving nurse. Opportunity for questions and clarification was provided. Assessment completed upon patients arrival to unit and care assumed.

## 2022-08-31 NOTE — DISCHARGE INSTRUCTIONS
HEART CATHETERIZATION/ANGIOGRAPHY DISCHARGE INSTRUCTIONS    Check puncture site frequently for swelling or bleeding. If there is any bleeding, lie down and apply pressure over the area with a clean towel or washcloth. Notify your doctor for any redness, swelling, drainage, or oozing from the puncture site. Notify your doctor for any fever or chills. If the extremity becomes cold, numb, or painful call Dr. Darion Pierre  Activity should be limited for the next 48 hours. Climb stairs as little as possible and avoid any stooping, bending, or strenuous activity for 48 hours. No heavy lifting (anything over 10 pounds) for 3 days. You may resume your usual diet. Drink more fluids than usual.  Have a responsible person drive you home and stay with you for at least 24 hours after your heart catheterization/angiography. You may remove bandage from your Right and Arm in 24 hours. You may shower in 24 hours. No tub baths, hot tubs, or swimming for 1 week. Do not place any lotions, creams, powders, or ointments over puncture site for 1 week. You may place a clean band-aid over the puncture site each day for 5 days. Change daily. I have read the above instructions and have had the opportunity to ask questions. DISCHARGE SUMMARY from Nurse    PATIENT INSTRUCTIONS:    After general anesthesia or intravenous sedation, for 24 hours or while taking prescription Narcotics:  Limit your activities  Do not drive and operate hazardous machinery  Do not make important personal or business decisions  Do  not drink alcoholic beverages  If you have not urinated within 8 hours after discharge, please contact your surgeon on call.     Report the following to your surgeon:  Excessive pain, swelling, redness or odor of or around the surgical area  Temperature over 100.5  Nausea and vomiting lasting longer than 4 hours or if unable to take medications  Any signs of decreased circulation or nerve impairment to extremity: change in color, persistent numbness, tingling, coldness or increase pain  Any questions    What to do at Home:  Recommended activity: Activity as tolerated and no driving for today,     These are general instructions for a healthy lifestyle:    No smoking/ No tobacco products/ Avoid exposure to second hand smoke  Surgeon General's Warning:  Quitting smoking now greatly reduces serious risk to your health. Obesity, smoking, and sedentary lifestyle greatly increases your risk for illness    A healthy diet, regular physical exercise & weight monitoring are important for maintaining a healthy lifestyle    You may be retaining fluid if you have a history of heart failure or if you experience any of the following symptoms:  Weight gain of 3 pounds or more overnight or 5 pounds in a week, increased swelling in our hands or feet or shortness of breath while lying flat in bed. Please call your doctor as soon as you notice any of these symptoms; do not wait until your next office visit. The discharge information has been reviewed with the patient. The patient verbalized understanding. Discharge medications reviewed with the patient and appropriate educational materials and side effects teaching were provided.   ___________________________________________________________________________________________________________________________________    Patient: ________________________   Date: 8/31/2022    Witness: _______________________   Date: 8/31/2022

## 2022-08-31 NOTE — Clinical Note
Contrast Dose Calculator:   Patient's age: 40.   Patient's sex: Male. Patient weight (kg) = 113. Creatinine level (mg/dL) = 0.9. Creatinine clearance (mL/min): 179.61. Max Contrast dose per Creatinine Cl calculator = 404. 13 mL.

## 2022-08-31 NOTE — Clinical Note
TRANSFER - IN REPORT:     Verbal report received from: Boone Hendrix RN. Report consisted of patient's Situation, Background, Assessment and   Recommendations(SBAR). Opportunity for questions and clarification was provided. Assessment completed upon patient's arrival to unit and care assumed. Patient transported with a Registered Nurse.

## 2022-08-31 NOTE — Clinical Note
TRANSFER - OUT REPORT:     Verbal report given to: Torri Urias RN. Report consisted of patient's Situation, Background, Assessment and   Recommendations(SBAR). Opportunity for questions and clarification was provided. Patient transported with a Registered Nurse. Patient transported to: holding area.

## 2022-09-13 ENCOUNTER — TELEPHONE (OUTPATIENT)
Dept: CARDIOLOGY CLINIC | Age: 37
End: 2022-09-13

## 2022-09-13 NOTE — TELEPHONE ENCOUNTER
Maame calling bc patient recently had open heart surgery. Patient will ow be a lifelong coumadin patient due to mechanical valve and would like to speak to the coumadin nurse.        [phone] Idalmis Whatley, nurse navigator for cardiac surgery

## 2022-09-13 NOTE — TELEPHONE ENCOUNTER
Attempted to contact pt at  number, no answer. m for pt to return call to office at [phone]. Will continue to try to contact pt. Yes

## 2022-09-14 NOTE — TELEPHONE ENCOUNTER
Attempted to contact Ana at number, no answer. [facility] for return call to office at [phone]. Will continue to try to contact.

## 2022-09-14 NOTE — TELEPHONE ENCOUNTER
Ana calling back to speak to nurse about jenna zavala.  States she will be at her desk and to call the same number back

## 2022-09-15 NOTE — TELEPHONE ENCOUNTER
Incoming from [facility]. Two patient Identifiers confirmed. Advised pt is scheduled for INR on Monday 9/19/2022. No home health agent assigned yet due to [address] She advised she was still awaiting information regarding auth for care. Bassam Munoz( outpatient coordinator) [phone]. INR goal 2.5-3.5 Current dosage assigned coumadin 4 mg daily. Pt is to be discharged today. Will follow up and review on Monday.

## 2022-09-19 ENCOUNTER — ANTI-COAG VISIT (OUTPATIENT)
Dept: CARDIOLOGY CLINIC | Age: 37
End: 2022-09-19
Payer: OTHER GOVERNMENT

## 2022-09-19 DIAGNOSIS — I48.20 CHRONIC ATRIAL FIBRILLATION (HCC): ICD-10-CM

## 2022-09-19 DIAGNOSIS — I34.1 MITRAL VALVE PROLAPSE: Primary | ICD-10-CM

## 2022-09-19 DIAGNOSIS — I48.0 PAF (PAROXYSMAL ATRIAL FIBRILLATION) (HCC): ICD-10-CM

## 2022-09-19 LAB
INR BLD: 4.7
PT POC: 56.6 SECONDS
VALID INTERNAL CONTROL?: YES

## 2022-09-19 PROCEDURE — 85610 PROTHROMBIN TIME: CPT | Performed by: INTERNAL MEDICINE

## 2022-09-19 NOTE — PROGRESS NOTES
The INR is above the therapeutic range. Ask the patient about bleeding complications.   Please make the following adjustments to Coumadin dosing: Verbal order and read back per Dr Meek Vieira   Hold coumadin today and tomorrow then resume coumadin 4 mg daily  Return on Friday

## 2022-09-19 NOTE — PATIENT INSTRUCTIONS
Verbal order and read back per Dr Tray Velasco coumadin today and tomorrow then resume coumadin 4 mg daily  Return on Friday

## 2022-09-23 ENCOUNTER — ANTI-COAG VISIT (OUTPATIENT)
Dept: CARDIOLOGY CLINIC | Age: 37
End: 2022-09-23
Payer: OTHER GOVERNMENT

## 2022-09-23 DIAGNOSIS — I48.20 CHRONIC ATRIAL FIBRILLATION (HCC): Primary | ICD-10-CM

## 2022-09-23 LAB
INR BLD: 2.1
PT POC: 24.9 SECONDS
VALID INTERNAL CONTROL?: YES

## 2022-09-23 PROCEDURE — 85610 PROTHROMBIN TIME: CPT | Performed by: INTERNAL MEDICINE

## 2022-09-23 NOTE — PATIENT INSTRUCTIONS
Verbal order and read back per Dr Juan Jose Lema  Take 2 tabs (8mg) coumadin today, and then resume coumadin 4 mg daily  Return on Friday

## 2022-09-23 NOTE — PROGRESS NOTES
The INR is below the therapeutic range.   Please make the following adjustments to Coumadin dosing: Verbal order and read back per Dr Abel Teran  Take 2 tabs (8mg) coumadin today, and then resume coumadin 4 mg daily  Return on Friday

## 2022-09-26 ENCOUNTER — TELEPHONE (OUTPATIENT)
Dept: CARDIOLOGY CLINIC | Age: 37
End: 2022-09-26

## 2022-09-27 NOTE — TELEPHONE ENCOUNTER
Contacted pt at [facility] number. Two patient Identifiers confirmed. Advised pt per Dr Betts Staff. Pt stated he had an appt with the surgeon on 9/29/22. He stated his sob is \"not as bad\" but he was \"still having a cough. \" Pt scheduled for 10/6/2022 with Dr Betts Staff. Pt verbalized understanding.
Contacted pt at [facility]. Two patient Identifiers confirmed. Following up with pt to check on his symptoms, pt says his SOB/coughing fits are still intermittent, but he has also been having near syncope and tight muscles, \"almost like he's about to have a seizure\". Advised pt to go to ER. Pt stated he has an appt with his PCP on Thursday. Advised pt that Dr. Burak Tolbert will review and advise. Pt verbalized understanding.
Pt in clinic for INR, had cath on 8/31, c/o shortness of breath and cough. While pt in office, could barely speak a few words without coughing or having to pause to catch his breath. Pt expressed concern about cough/SOB. Will review with provider and advise.
Verbal order and read back per Linda Méndez MD  Needs sooner appt
Monthly or less

## 2022-09-30 ENCOUNTER — ANTI-COAG VISIT (OUTPATIENT)
Dept: CARDIOLOGY CLINIC | Age: 37
End: 2022-09-30
Payer: OTHER GOVERNMENT

## 2022-09-30 DIAGNOSIS — I48.20 CHRONIC ATRIAL FIBRILLATION (HCC): Primary | ICD-10-CM

## 2022-09-30 LAB
INR BLD: 3.8
PT POC: 45.8 SECONDS
VALID INTERNAL CONTROL?: YES

## 2022-09-30 PROCEDURE — 85610 PROTHROMBIN TIME: CPT | Performed by: INTERNAL MEDICINE

## 2022-09-30 NOTE — PROGRESS NOTES
The INR is above the therapeutic range. Ask the patient about bleeding complications.   Please make the following adjustments to Coumadin dosing: .Verbal order and read back per Dr Curry Rendon  Take coumadin 4 mg daily   Return on Friday

## 2022-10-06 ENCOUNTER — OFFICE VISIT (OUTPATIENT)
Dept: CARDIOLOGY CLINIC | Age: 37
End: 2022-10-06
Payer: OTHER GOVERNMENT

## 2022-10-06 VITALS
HEART RATE: 85 BPM | TEMPERATURE: 97.9 F | RESPIRATION RATE: 16 BRPM | WEIGHT: 233 LBS | OXYGEN SATURATION: 97 % | SYSTOLIC BLOOD PRESSURE: 117 MMHG | DIASTOLIC BLOOD PRESSURE: 73 MMHG | BODY MASS INDEX: 31.6 KG/M2

## 2022-10-06 DIAGNOSIS — Z95.2 MITRAL VALVE REPLACED: ICD-10-CM

## 2022-10-06 DIAGNOSIS — I34.1 MITRAL VALVE PROLAPSE: Primary | ICD-10-CM

## 2022-10-06 PROCEDURE — 99214 OFFICE O/P EST MOD 30 MIN: CPT | Performed by: INTERNAL MEDICINE

## 2022-10-06 RX ORDER — WARFARIN 4 MG/1
4 TABLET ORAL DAILY
COMMUNITY
End: 2022-10-12 | Stop reason: SDUPTHER

## 2022-10-06 NOTE — PROGRESS NOTES
[facility]    Mr. Olivia Casanova is 40 y.o. male with a history of mitral valve prolapse, MR s/p mechanical mitral valve replacement in 09/2022  and atrial fibrillation s/p PVI and TALIA closure in 09/2022    Patient is here today for follow-up appointment. Patient has known history of mitral valve prolapse with significant mitral regurgitation which eventually required mitral valve replacement with mechanical mitral valve along with PVI plus TALIA ligation at [facility] in 09/8/22. Patient is doing very well after surgery. He is recovering well. He is feeling much better. His fatigue and shortness of breath is improved significantly after surgery. His exercise capacity has improved. He has lost 15 pounds. Is getting home health  Denies any chest pain or chest tightness. No palpitation, presyncope or syncope    Past Medical History:   Diagnosis Date    Fetal alcohol syndrome     H/O prosthetic mitral valve 09/2022    Mechanical    Hx of cardiac cath 08/2022    No evidence of LAD    Mitral regurgitation     MVP (mitral valve prolapse)     PAF (paroxysmal atrial fibrillation) ([facility])     S/P PVI and TALIA ligation along with MVR 09/2022       Past Surgical History:   Procedure Laterality Date    HX TONSIL AND ADENOIDECTOMY         Current Outpatient Medications   Medication Sig    warfarin (Coumadin) 4 mg tablet Take 4 mg by mouth daily. ergocalciferol, vitamin D2, (VITAMIN D2 PO) Take 5,000 Units by mouth.    propranoloL (INDERAL) 20 mg tablet Take 20 mg by mouth two (2) times a day. No current facility-administered medications for this visit. Allergies and Sensitivities:  Allergies   Allergen Reactions    Codeine Unknown (comments)    Novahistine [Chlorpheniramine-Phenylephrine] Unknown (comments)       Family History:  No family history on file.     Social History:  Social History     Tobacco Use    Smoking status: Former     Types: Cigarettes     Start date: 2008     Quit date: 2019     Years since quitting: 3.7    Smokeless tobacco: Never   Substance Use Topics    Alcohol use: Yes    Drug use: Never     He  reports that he quit smoking about 3 years ago. His smoking use included cigarettes. He started smoking about 14 years ago. He has never used smokeless tobacco.  He  reports current alcohol use. Review of Systems:  Cardiac symptoms as noted above in HPI. All others negative. Physical Exam:  BP Readings from Last 3 Encounters:   10/06/22 117/73   08/31/22 (!) 148/78   08/23/22 126/82         Pulse Readings from Last 3 Encounters:   10/06/22 85   08/31/22 80   08/23/22 66          Wt Readings from Last 3 Encounters:   10/06/22 105.7 kg (233 lb)   08/31/22 113.4 kg (250 lb)   08/23/22 115.7 kg (255 lb)       Constitutional: Oriented to person, place, and time. HENT: Head: Normocephalic and atraumatic. Neck: No JVD present. Carotid bruit is not appreciated. Cardiovascular: Irregular rhythm   no gallop or rubs appreciated. Systolic  murmur on LSB and apex   Lung: Breath sounds normal. No respiratory distress. No ronchi or rales appreciated  Abdominal: No tenderness. No rebound and no guarding. Musculoskeletal: There is no lower extremity edema. No cynosis    Review of Data  LABS:   Lab Results   Component Value Date/Time    Sodium 139 08/31/2022 09:00 AM    Potassium 3.7 08/31/2022 09:00 AM    Chloride 107 08/31/2022 09:00 AM    CO2 25 08/31/2022 09:00 AM    Glucose 102 (H) 08/31/2022 09:00 AM    BUN 10 08/31/2022 09:00 AM    Creatinine 0.92 08/31/2022 09:00 AM     No flowsheet data found. Lab Results   Component Value Date/Time    ALT (SGPT) 92 (H) 02/11/2021 02:10 PM     No results found for: HBA1C, MTS9JKBN, ZNP9NOPX, LJW0OJQL    EKG  (01/19) sinus rhythm at 64 bpm.  Nonspecific T wave changes.   Otherwise normal EKG  (3/20) A.fib  8/2020: Sinus rhythm     ECHO (04/2022)  Left Ventricle Left ventricle size is normal. Moderately increased wall thickness. Severe septal thickening. Normal wall motion. Normal left ventricular systolic function with a visually estimated EF of 60 - 65%. Indeterminate diastolic function due to mitral valve disease. Left Atrium Left atrium is severely dilated. Left atrial volume index is severely increased (>48 mL/m2). Right Ventricle Right ventricle is moderately dilated. Normal systolic function. TAPSE is normal.   Right Atrium Right atrium is moderately dilated. Aortic Valve Mild sclerosis of the aortic valve cusp. No regurgitation. No stenosis. Mitral Valve Moderately thickened leaflet, at the posterior leaflet. Moderate leaflet prolapse noted of the posterior leaflet. Flail posterior leaflet. Severe transvalvular regurgitation with an eccentrically directed jet and may underestimate severity. Moderate visually. PISA calculation ERO 0.8 cm2  Recommend MAIK   No stenosis noted. Tricuspid Valve Valve structure is normal. Trace transvalvular regurgitation. No stenosis noted. Pulmonic Valve The pulmonic valve was not well visualized. Valve structure is normal. Trace transvalvular regurgitation. No stenosis noted. Pulmonary Artery Pulmonary hypertension not present. Mr. Trevon Horvath is 40 y.o. male with no significant past medical history except possible fetal alcohol syndrome baby    Mitral valve prolapse /mitral regurgitation s/p MVR:  Patient had a echocardiogram in January 2019 which showed posterior leaflet prolapse of mitral valve along with moderate to severe mitral regurgitation. MAIK in June 2020 showed moderate regurgitation in the setting of posterior valve prolapse   Echo in 04/2022 showed likely severe mitral regurgitation in the setting of prolapse. MAIK 05/2022, Normal EF, mod-severe MR, prolapse and flail leaflet. S/p MVR with mechanical mitral valve, PVI plus TALIA ligation at [facility] in 09/8/22    atrial fibrillation: Diagnosed in March 2020.  On exam, he appears to be in sinus rhythm. Continue Propranolol  S/p PVI plus TALIA ligation along with MVR with mechanical valve in 09/8/22  Currently on Coumadin. Goal INR should be between 2.5 and 3.5. This is being checked by [facility]. I have instructed patient that he should be getting INR regularly at least once a month with history of mechanical mitral valve. He understands the plan    This plan was discussed with patient who is in agreement. Thank you for allowing me to participate in patient care. Please feel free to call me if you have any question or concern. Charanjit Baum MD  Please note: This document has been produced using voice recognition software. Unrecognized errors in transcription may be present.

## 2022-10-06 NOTE — PROGRESS NOTES
Identified pt with two pt identifiers(name and ). Reviewed record in preparation for visit and have obtained necessary documentation. Stacia Arzate presents today for   Chief Complaint   Patient presents with    Follow-up     Post MV repair       Pt c/o cough. Stacia Arzate preferred language for health care discussion is english/other. Personal Protective Equipment:   Personal Protective Equipment was used including: mask-surgical and hands-gloves. Patient was placed on no precaution(s). Patient was masked. Precautions:   Patient currently on None  Patient currently roomed with door closed. Is someone accompanying this pt? no    Is the patient using any DME equipment during [address]? no    Depression Screening:  3 most recent PHQ Screens 10/6/2022   Little interest or pleasure in doing things Not at all   Feeling down, depressed, irritable, or hopeless Not at all   Total Score PHQ 2 0       Learning Assessment:  No flowsheet data found. Abuse Screening:  Abuse Screening Questionnaire 2021   Do you ever feel afraid of your partner? N   Are you in a relationship with someone who physically or mentally threatens you? N   Is it safe for you to go home? Y       Fall Risk  Fall Risk Assessment, last 12 mths 2020   Able to walk? Yes   Fall in past 12 months? No       Pt currently taking Anticoagulant therapy? yes  Pt currently taking Antiplatelet therapy? no    Coordination of Care:  1. Have you been to the ER, urgent care clinic since your last visit? Hospitalized since your last visit?no    2. Have you seen or consulted any other health care providers outside of the [address] [place] [address] since your last visit? Include any pap smears or colon screening. Yes Dr Oleary Stefania      Please see Red banners under Allergies and Med Rec to remove outside inquires. All correct information has been verified with patient and added to chart.      Medication's patient's would liked removed has been marked not taking to be removed per Verbal order and read back per Jan Patel MD

## 2022-10-06 NOTE — LETTER
10/6/2022    Patient: Alexei Wu   YOB: 1985   Date of Visit: 10/6/2022     Anirudh Greenberg MD  [address] [place] [id number]  Via Fax: [phone]    Dear Anirudh Greenberg MD,      Thank you for referring Mr. Santana Mixon to [facility]IST AT [facility] for evaluation. My notes for this consultation are attached. If you have questions, please do not hesitate to call me. I look forward to following your patient along with you.       Sincerely,    Wally Zaragoza MD

## 2022-10-07 ENCOUNTER — TELEPHONE ANTICOAG (OUTPATIENT)
Dept: CARDIOLOGY CLINIC | Age: 37
End: 2022-10-07

## 2022-10-07 ENCOUNTER — CLINICAL SUPPORT (OUTPATIENT)
Dept: CARDIOLOGY CLINIC | Age: 37
End: 2022-10-07

## 2022-10-07 ENCOUNTER — CLINICAL SUPPORT (OUTPATIENT)
Dept: CARDIOLOGY CLINIC | Age: 37
End: 2022-10-07
Payer: OTHER GOVERNMENT

## 2022-10-07 DIAGNOSIS — I48.20 CHRONIC ATRIAL FIBRILLATION (HCC): Primary | ICD-10-CM

## 2022-10-07 DIAGNOSIS — Z95.2 MITRAL VALVE REPLACED: Primary | ICD-10-CM

## 2022-10-07 LAB
INR BLD: 3.1
INR, EXTERNAL: 3.1
PT POC: 37.1 SECONDS
VALID INTERNAL CONTROL?: YES

## 2022-10-07 PROCEDURE — 85610 PROTHROMBIN TIME: CPT | Performed by: INTERNAL MEDICINE

## 2022-10-07 NOTE — PROGRESS NOTES
The INR is stable and therapeutic.    Verbal order and read back per Dr Aguilar Jimenez   Take coumadin 4 mg daily   Return In 1 week

## 2022-10-12 NOTE — TELEPHONE ENCOUNTER
PCP: None    Last appt: 10/7/2022  Future Appointments   Date Time Provider Martina Adler   10/14/2022  2:00 PM CSI, PT INR [facility] AMB   4/6/2023  2:30 PM Penny Noonan MD [facility] AMB       Requested Prescriptions     Pending Prescriptions Disp Refills    propranoloL (INDERAL) 20 mg tablet 180 Tablet 2     Sig: Take 1 Tablet by mouth two (2) times a day. warfarin (Coumadin) 4 mg tablet 90 Tablet 2     Sig: Take 1 Tablet by mouth daily. spironolactone (Aldactone) 25 mg tablet 90 Tablet 2     Sig: Take 1 Tablet by mouth daily.

## 2022-10-13 RX ORDER — SPIRONOLACTONE 25 MG/1
25 TABLET ORAL DAILY
COMMUNITY
End: 2022-10-13 | Stop reason: SDUPTHER

## 2022-10-14 ENCOUNTER — ANTI-COAG VISIT (OUTPATIENT)
Dept: CARDIOLOGY CLINIC | Age: 37
End: 2022-10-14
Payer: OTHER GOVERNMENT

## 2022-10-14 DIAGNOSIS — I48.20 CHRONIC ATRIAL FIBRILLATION (HCC): Primary | ICD-10-CM

## 2022-10-14 LAB
INR BLD: 2.7
PT POC: 32.2 SECONDS
VALID INTERNAL CONTROL?: YES

## 2022-10-14 PROCEDURE — 85610 PROTHROMBIN TIME: CPT | Performed by: INTERNAL MEDICINE

## 2022-10-14 RX ORDER — PROPRANOLOL HYDROCHLORIDE 20 MG/1
20 TABLET ORAL 2 TIMES DAILY
Qty: 180 TABLET | Refills: 2 | Status: SHIPPED | OUTPATIENT
Start: 2022-10-14

## 2022-10-14 RX ORDER — WARFARIN 4 MG/1
4 TABLET ORAL DAILY
Qty: 90 TABLET | Refills: 2 | Status: SHIPPED | OUTPATIENT
Start: 2022-10-14

## 2022-10-14 RX ORDER — SPIRONOLACTONE 25 MG/1
25 TABLET ORAL DAILY
Qty: 90 TABLET | Refills: 2 | Status: SHIPPED | OUTPATIENT
Start: 2022-10-14

## 2022-10-14 NOTE — PROGRESS NOTES
The INR is stable and therapeutic    Verbal order and read back per Dr Jaelyn Joy   Take coumadin 4 mg daily   Return In 1 week

## 2022-10-21 ENCOUNTER — ANTI-COAG VISIT (OUTPATIENT)
Dept: CARDIOLOGY CLINIC | Age: 37
End: 2022-10-21
Payer: OTHER GOVERNMENT

## 2022-10-21 DIAGNOSIS — I48.20 CHRONIC ATRIAL FIBRILLATION (HCC): Primary | ICD-10-CM

## 2022-10-21 LAB
INR BLD: 2.6
PT POC: 31 SECONDS
VALID INTERNAL CONTROL?: YES

## 2022-10-21 PROCEDURE — 85610 PROTHROMBIN TIME: CPT | Performed by: INTERNAL MEDICINE

## 2022-10-21 NOTE — PROGRESS NOTES
The INR is stable and therapeutic.   Verbal order and read back per Dr Harshil Ervin   Take coumadin 4 mg daily   Return In 1 week

## 2022-10-25 ENCOUNTER — OFFICE VISIT (OUTPATIENT)
Dept: CARDIOLOGY CLINIC | Age: 37
End: 2022-10-25

## 2022-10-28 ENCOUNTER — ANTI-COAG VISIT (OUTPATIENT)
Dept: CARDIOLOGY CLINIC | Age: 37
End: 2022-10-28
Payer: OTHER GOVERNMENT

## 2022-10-28 DIAGNOSIS — I48.20 CHRONIC ATRIAL FIBRILLATION (HCC): Primary | ICD-10-CM

## 2022-10-28 LAB
INR BLD: 2.3
PT POC: NORMAL
VALID INTERNAL CONTROL?: YES

## 2022-10-28 PROCEDURE — 85610 PROTHROMBIN TIME: CPT | Performed by: INTERNAL MEDICINE

## 2022-10-28 NOTE — PROGRESS NOTES
The INR is below the therapeutic range. Please make the following adjustments to Coumadin dosing: Verbal order with read back Valeriano Sy MD.  Take 6 mg today then resume take coumadin 4 mg daily   Return In 1 week

## 2022-10-28 NOTE — PATIENT INSTRUCTIONS
Verbal order with read back Valeriano Collier MD.  Take 6 mg today then resume take coumadin 4 mg daily   Return In 1 week

## 2022-11-03 ENCOUNTER — TELEPHONE (OUTPATIENT)
Dept: CARDIOLOGY CLINIC | Age: 37
End: 2022-11-03

## 2022-11-03 NOTE — TELEPHONE ENCOUNTER
[place] [phone]    Cardiac rehab at [facility] needs clearance from the [address] and patient states that we would be handling that

## 2022-11-04 ENCOUNTER — ANTI-COAG VISIT (OUTPATIENT)
Dept: CARDIOLOGY CLINIC | Age: 37
End: 2022-11-04
Payer: OTHER GOVERNMENT

## 2022-11-04 DIAGNOSIS — I48.20 CHRONIC ATRIAL FIBRILLATION (HCC): Primary | ICD-10-CM

## 2022-11-04 DIAGNOSIS — Z95.2 MITRAL VALVE REPLACED: ICD-10-CM

## 2022-11-04 LAB
INR BLD: 2.9
PT POC: 34.2 SECONDS
VALID INTERNAL CONTROL?: YES

## 2022-11-04 PROCEDURE — 85610 PROTHROMBIN TIME: CPT | Performed by: INTERNAL MEDICINE

## 2022-11-04 NOTE — PATIENT INSTRUCTIONS
Verbal order with read back Valeriano Aquino MD.  resume take coumadin 4 mg daily   Return In 2 weeks

## 2022-11-04 NOTE — PROGRESS NOTES
The INR is stable and therapeutic. Verbal order with read back Valeriano Pride MD.  resume take coumadin 4 mg daily   Return In 2 weeks

## 2022-11-18 ENCOUNTER — ANTI-COAG VISIT (OUTPATIENT)
Dept: CARDIOLOGY CLINIC | Age: 37
End: 2022-11-18
Payer: OTHER GOVERNMENT

## 2022-11-18 DIAGNOSIS — I48.20 CHRONIC ATRIAL FIBRILLATION (HCC): Primary | ICD-10-CM

## 2022-11-18 LAB
INR BLD: 2.5
PT POC: 29.9 SECONDS
VALID INTERNAL CONTROL?: YES

## 2022-11-18 PROCEDURE — 85610 PROTHROMBIN TIME: CPT | Performed by: INTERNAL MEDICINE

## 2022-11-21 NOTE — TELEPHONE ENCOUNTER
F[facility]ed [facility] auth showing approved for cardiac rehab. To cardiac rehab( [facility]) Received confirmation of receipt.

## 2022-12-02 ENCOUNTER — ANTI-COAG VISIT (OUTPATIENT)
Dept: CARDIOLOGY CLINIC | Age: 37
End: 2022-12-02
Payer: OTHER GOVERNMENT

## 2022-12-02 DIAGNOSIS — I48.20 CHRONIC ATRIAL FIBRILLATION (HCC): Primary | ICD-10-CM

## 2022-12-02 LAB
INR BLD: 1.5
PT POC: 18.1 SECONDS
VALID INTERNAL CONTROL?: YES

## 2022-12-02 NOTE — PATIENT INSTRUCTIONS
Verbal order with read back Ivan Magdaleno MD.  Make diet modifications tonight, then resume take coumadin 4 mg daily   Return on monday

## 2022-12-02 NOTE — PROGRESS NOTES
The INR is below the therapeutic range.   Please make the following adjustments to Coumadin dosing: Verbal order with read back Lio Pardo MD.  Make diet modifications tonight, then resume take coumadin 4 mg daily   Return on monday

## 2022-12-05 ENCOUNTER — ANTI-COAG VISIT (OUTPATIENT)
Dept: CARDIOLOGY CLINIC | Age: 37
End: 2022-12-05
Payer: OTHER GOVERNMENT

## 2022-12-05 DIAGNOSIS — Z95.2 MITRAL VALVE REPLACED: ICD-10-CM

## 2022-12-05 DIAGNOSIS — I48.20 CHRONIC ATRIAL FIBRILLATION (HCC): Primary | ICD-10-CM

## 2022-12-05 LAB
INR BLD: 1.5
PT POC: 17.7 SECONDS
VALID INTERNAL CONTROL?: YES

## 2022-12-05 PROCEDURE — 85610 PROTHROMBIN TIME: CPT | Performed by: INTERNAL MEDICINE

## 2022-12-05 NOTE — PROGRESS NOTES
The INR is below the therapeutic range.   Verbal order with read back Cezar Ramírez MD.   X 2 tabs today then resume coumadin 4 mg daily   Return on monday

## 2022-12-05 NOTE — PATIENT INSTRUCTIONS
Verbal order with read back Elisabeth Hylton MD.   X 2 tabs today then resume coumadin 4 mg daily   Return on monday

## 2022-12-12 ENCOUNTER — ANTI-COAG VISIT (OUTPATIENT)
Dept: CARDIOLOGY CLINIC | Age: 37
End: 2022-12-12
Payer: OTHER GOVERNMENT

## 2022-12-12 DIAGNOSIS — I48.20 CHRONIC ATRIAL FIBRILLATION (HCC): Primary | ICD-10-CM

## 2022-12-12 LAB
INR BLD: 1.7
PT POC: 20.5 SECONDS
VALID INTERNAL CONTROL?: YES

## 2022-12-12 PROCEDURE — 85610 PROTHROMBIN TIME: CPT | Performed by: INTERNAL MEDICINE

## 2022-12-12 NOTE — PROGRESS NOTES
The INR is below the therapeutic range.   Verbal order with read back Roseline Rodriguez MD.   X 2 tabs x 2 days then resume coumadin 4 mg daily   Return on monda

## 2022-12-12 NOTE — PATIENT INSTRUCTIONS
Verbal order with read back Norah Piña MD.   X 2 tabs x 2 days then resume coumadin 4 mg daily   Return on monda

## 2022-12-19 ENCOUNTER — ANTI-COAG VISIT (OUTPATIENT)
Dept: CARDIOLOGY CLINIC | Age: 37
End: 2022-12-19
Payer: OTHER GOVERNMENT

## 2022-12-19 DIAGNOSIS — I48.20 CHRONIC ATRIAL FIBRILLATION (HCC): Primary | ICD-10-CM

## 2022-12-19 LAB
INR BLD: 2.7
PT POC: 32.1 SECONDS
VALID INTERNAL CONTROL?: YES

## 2022-12-19 PROCEDURE — 85610 PROTHROMBIN TIME: CPT | Performed by: INTERNAL MEDICINE

## 2022-12-23 ENCOUNTER — ANTI-COAG VISIT (OUTPATIENT)
Dept: CARDIOLOGY CLINIC | Age: 37
End: 2022-12-23
Payer: OTHER GOVERNMENT

## 2022-12-23 DIAGNOSIS — I48.20 CHRONIC ATRIAL FIBRILLATION (HCC): Primary | ICD-10-CM

## 2022-12-23 LAB
INR BLD: 2.5
PT POC: 29.6 SECONDS
VALID INTERNAL CONTROL?: YES

## 2022-12-23 PROCEDURE — 85610 PROTHROMBIN TIME: CPT | Performed by: INTERNAL MEDICINE

## 2022-12-23 NOTE — PATIENT INSTRUCTIONS
Verbal order with read back Dr. Morales    Take coumadin 6 mg today then resume coumadin 4 mg daily   Return on 1 week

## 2022-12-27 NOTE — PROGRESS NOTES
Verbal order with read back Dr. Berenice Nunez    Take coumadin 6 mg today then resume coumadin 4 mg daily   Return on 1 week

## 2022-12-30 ENCOUNTER — ANTI-COAG VISIT (OUTPATIENT)
Dept: CARDIOLOGY CLINIC | Age: 37
End: 2022-12-30
Payer: OTHER GOVERNMENT

## 2022-12-30 DIAGNOSIS — I48.20 CHRONIC ATRIAL FIBRILLATION (HCC): Primary | ICD-10-CM

## 2022-12-30 LAB
INR BLD: 2.2
PT POC: NORMAL
VALID INTERNAL CONTROL?: YES

## 2022-12-30 NOTE — PATIENT INSTRUCTIONS
Verbal order with read back Dr. Jatin Ardon    Take coumadin 6 mg today then resume coumadin 4 mg daily   Return on 1 week

## 2023-01-06 ENCOUNTER — ANTI-COAG VISIT (OUTPATIENT)
Dept: CARDIOLOGY CLINIC | Age: 38
End: 2023-01-06
Payer: OTHER GOVERNMENT

## 2023-01-06 DIAGNOSIS — I48.20 CHRONIC ATRIAL FIBRILLATION (HCC): Primary | ICD-10-CM

## 2023-01-06 LAB
INR BLD: 2.7
PT POC: 32.7 SECONDS
VALID INTERNAL CONTROL?: YES

## 2023-01-06 NOTE — PROGRESS NOTES
The INR is stable and therapeutic.    Verbal order with read back Dr. Jenni Caban    resume coumadin 4 mg daily   Return on 2 weeks

## 2023-01-20 ENCOUNTER — ANTI-COAG VISIT (OUTPATIENT)
Dept: CARDIOLOGY CLINIC | Age: 38
End: 2023-01-20
Payer: OTHER GOVERNMENT

## 2023-01-20 DIAGNOSIS — I48.20 CHRONIC ATRIAL FIBRILLATION (HCC): Primary | ICD-10-CM

## 2023-01-20 PROBLEM — Z95.2 MITRAL VALVE REPLACED: Status: ACTIVE | Noted: 2023-01-20

## 2023-01-20 LAB
INR BLD: 2.2
PT POC: 26.8 SECONDS
VALID INTERNAL CONTROL?: YES

## 2023-01-20 NOTE — PROGRESS NOTES
Verbal order with read back Dr. Loco Potter   coumadin 6 mg today and tomorrow then resume coumadin 4 mg daily   Return on 1 week

## 2023-01-20 NOTE — PATIENT INSTRUCTIONS
Verbal order with read back Dr. Disha Estrella   coumadin 6 mg today and tomorrow then resume coumadin 4 mg daily   Return on 1 week

## 2023-01-27 ENCOUNTER — ANTI-COAG VISIT (OUTPATIENT)
Dept: CARDIOLOGY CLINIC | Age: 38
End: 2023-01-27
Payer: OTHER GOVERNMENT

## 2023-01-27 DIAGNOSIS — I48.20 CHRONIC ATRIAL FIBRILLATION (HCC): Primary | ICD-10-CM

## 2023-01-27 DIAGNOSIS — Z95.2 MITRAL VALVE REPLACED: ICD-10-CM

## 2023-01-27 LAB
INR BLD: 2.1
PT POC: 24.9 SECONDS
VALID INTERNAL CONTROL?: YES

## 2023-01-27 PROCEDURE — 85610 PROTHROMBIN TIME: CPT | Performed by: INTERNAL MEDICINE

## 2023-01-27 NOTE — PROGRESS NOTES
The INR is below the therapeutic range.   Verbal order with read back Dr. Kristofer Fuentes   coumadin 6 mg to day and tomorrow then resume coumadin 4 mg daily   Return on 1 week

## 2023-01-27 NOTE — PATIENT INSTRUCTIONS
Verbal order with read back Dr. India Gan   coumadin 6 mg to day and tomorrow then resume coumadin 4 mg daily   Return on 1 week

## 2023-02-03 ENCOUNTER — ANTI-COAG VISIT (OUTPATIENT)
Dept: CARDIOLOGY CLINIC | Age: 38
End: 2023-02-03
Payer: OTHER GOVERNMENT

## 2023-02-03 DIAGNOSIS — I48.20 CHRONIC ATRIAL FIBRILLATION (HCC): Primary | ICD-10-CM

## 2023-02-03 LAB
INR BLD: 1.7
PT POC: 20.9 SECONDS
VALID INTERNAL CONTROL?: YES

## 2023-02-03 PROCEDURE — 85610 PROTHROMBIN TIME: CPT | Performed by: INTERNAL MEDICINE

## 2023-02-03 NOTE — PATIENT INSTRUCTIONS
Verbal order with read back Dr. Bob De Los Santos   coumadin 6 mg today, Saturday, sunday and Monday  then resume coumadin 4 mg daily   Return on 1 week

## 2023-02-07 NOTE — PROGRESS NOTES
Verbal order with read back Dr. Jayson Perera   coumadin 6 mg today, Saturday, sunday and Monday  then resume coumadin 4 mg daily   Return on 1 week

## 2023-02-10 ENCOUNTER — ANTI-COAG VISIT (OUTPATIENT)
Dept: CARDIOLOGY CLINIC | Age: 38
End: 2023-02-10
Payer: OTHER GOVERNMENT

## 2023-02-10 DIAGNOSIS — I48.20 CHRONIC ATRIAL FIBRILLATION (HCC): Primary | ICD-10-CM

## 2023-02-10 DIAGNOSIS — Z95.2 MITRAL VALVE REPLACED: ICD-10-CM

## 2023-02-10 LAB
INR BLD: 2.3
PT POC: 27.7 SECONDS
VALID INTERNAL CONTROL?: YES

## 2023-02-10 NOTE — PROGRESS NOTES
The INR is below the therapeutic range.     Verbal order with read back   coumadin 4 mg x 2 today then resume 4 mg daily   Return on 1 week

## 2023-02-17 ENCOUNTER — ANTI-COAG VISIT (OUTPATIENT)
Age: 38
End: 2023-02-17

## 2023-02-17 DIAGNOSIS — Z95.2 MITRAL VALVE REPLACED: ICD-10-CM

## 2023-02-17 DIAGNOSIS — I34.1 NONRHEUMATIC MITRAL (VALVE) PROLAPSE: Primary | ICD-10-CM

## 2023-02-17 LAB
POC INR: 2
PROTHROMBIN TIME, POC: 23.4

## 2023-02-17 NOTE — PROGRESS NOTES
Verbal order with read back Duarte Craig MD.  coumadin 6 mg today, tomorrow and sunday then resume 4 mg daily   Return on 1 week

## 2023-02-17 NOTE — PATIENT INSTRUCTIONS
Verbal order with read back Thad Pacheco MD.  coumadin 6 mg today, tomorrow and sunday then resume 4 mg daily   Return on 1 week

## 2023-02-24 ENCOUNTER — ANTI-COAG VISIT (OUTPATIENT)
Age: 38
End: 2023-02-24

## 2023-02-24 DIAGNOSIS — Z95.2 MITRAL VALVE REPLACED: ICD-10-CM

## 2023-02-24 DIAGNOSIS — I34.1 NONRHEUMATIC MITRAL (VALVE) PROLAPSE: Primary | ICD-10-CM

## 2023-02-24 LAB
POC INR: 2
PROTHROMBIN TIME, POC: NORMAL

## 2023-02-24 NOTE — PATIENT INSTRUCTIONS
Verbal order with read back Dr Farzana Gipson.    Take coumadin 4 mg today and 6 mg tomorrow then resume coumadin 4 mg daily   Return on 1 week

## 2023-03-03 ENCOUNTER — ANTI-COAG VISIT (OUTPATIENT)
Age: 38
End: 2023-03-03

## 2023-03-03 DIAGNOSIS — Z95.2 MITRAL VALVE REPLACED: ICD-10-CM

## 2023-03-03 DIAGNOSIS — I34.1 NONRHEUMATIC MITRAL (VALVE) PROLAPSE: Primary | ICD-10-CM

## 2023-03-03 LAB
POC INR: 1.9
PROTHROMBIN TIME, POC: 22.9

## 2023-03-03 NOTE — PROGRESS NOTES
The INR is below the therapeutic range.   Verbal order with read back Dr Ethel Mcnulty   Take coumadin 6 mg Monday Wednesday and Friday and 4 mg all other days  Return on 1 week

## 2023-03-10 ENCOUNTER — ANTI-COAG VISIT (OUTPATIENT)
Age: 38
End: 2023-03-10
Payer: OTHER GOVERNMENT

## 2023-03-10 DIAGNOSIS — I34.1 NONRHEUMATIC MITRAL (VALVE) PROLAPSE: Primary | ICD-10-CM

## 2023-03-10 LAB
POC INR: 2.2
PROTHROMBIN TIME, POC: 26.4

## 2023-03-10 PROCEDURE — 85610 PROTHROMBIN TIME: CPT | Performed by: INTERNAL MEDICINE

## 2023-03-10 NOTE — PROGRESS NOTES
Verbal order with read back Dr Duff  Take coumadin 6 mg today tomorrow and Sunday then resume Monday,  Wednesday and Friday coumadin 6 mg and 4 mg all other days  Return on 1 week

## 2023-03-10 NOTE — PATIENT INSTRUCTIONS
Verbal order with read back Dr Lissa Howe  Take coumadin 6 mg today tomorrow and Sunday then resume Monday,  Wednesday and Friday coumadin 6 mg and 4 mg all other days  Return on 1 week

## 2023-03-17 ENCOUNTER — ANTI-COAG VISIT (OUTPATIENT)
Age: 38
End: 2023-03-17
Payer: OTHER GOVERNMENT

## 2023-03-17 DIAGNOSIS — I34.1 NONRHEUMATIC MITRAL (VALVE) PROLAPSE: Primary | ICD-10-CM

## 2023-03-17 LAB
POC INR: 2.4
PROTHROMBIN TIME, POC: 28.4

## 2023-03-17 PROCEDURE — 85610 PROTHROMBIN TIME: CPT | Performed by: INTERNAL MEDICINE

## 2023-03-17 NOTE — PROGRESS NOTES
Verbal order with read back Dr Joann Hough  Take coumadin 8 mg today resume Monday,  Wednesday and Friday coumadin 6 mg and 4 mg all other days  Return on 1 week

## 2023-03-17 NOTE — PATIENT INSTRUCTIONS
Verbal order with read back Dr Arianna Camejo  Take coumadin 8 mg today resume Monday,  Wednesday and Friday coumadin 6 mg and 4 mg all other days  Return on 1 week

## 2023-03-24 ENCOUNTER — ANTI-COAG VISIT (OUTPATIENT)
Age: 38
End: 2023-03-24
Payer: OTHER GOVERNMENT

## 2023-03-24 DIAGNOSIS — Z95.2 MITRAL VALVE REPLACED: Primary | ICD-10-CM

## 2023-03-24 LAB
POC INR: 2.6
PROTHROMBIN TIME, POC: 31.6

## 2023-03-24 PROCEDURE — 85610 PROTHROMBIN TIME: CPT | Performed by: INTERNAL MEDICINE

## 2023-03-24 NOTE — PROGRESS NOTES
The INR is stable and therapeutic.    Verbal order with read back Dr Valeriy Cuevas   Monday,  Wednesday and Friday coumadin 6 mg and 4 mg all other days  Return on 1 week

## 2023-03-30 ENCOUNTER — OFFICE VISIT (OUTPATIENT)
Age: 38
End: 2023-03-30
Payer: OTHER GOVERNMENT

## 2023-03-30 VITALS
BODY MASS INDEX: 33.91 KG/M2 | HEART RATE: 75 BPM | TEMPERATURE: 97.6 F | SYSTOLIC BLOOD PRESSURE: 119 MMHG | OXYGEN SATURATION: 97 % | WEIGHT: 250 LBS | DIASTOLIC BLOOD PRESSURE: 61 MMHG

## 2023-03-30 DIAGNOSIS — I34.1 NONRHEUMATIC MITRAL (VALVE) PROLAPSE: ICD-10-CM

## 2023-03-30 DIAGNOSIS — I48.0 PAF (PAROXYSMAL ATRIAL FIBRILLATION) (HCC): Primary | ICD-10-CM

## 2023-03-30 DIAGNOSIS — Z95.2 MITRAL VALVE REPLACED: ICD-10-CM

## 2023-03-30 PROCEDURE — 99213 OFFICE O/P EST LOW 20 MIN: CPT | Performed by: INTERNAL MEDICINE

## 2023-03-30 NOTE — PROGRESS NOTES
Identified pt with two pt identifiers(name and ). Reviewed record in preparation for visit and have obtained necessary documentation. Angela Shafer presents today for   Chief Complaint   Patient presents with    Follow-up     6m       Pt denied  DIZZINESS, SOB, CHEST PAIN/ PRESSURE, FATIGUE/WEAKNESS, HEADACHES, SWELLING. Angela Shafer preferred language for health care discussion is english/other. Personal Protective Equipment:   Personal Protective Equipment was used including: mask-surgical and hands-gloves. Patient was placed on no precaution(s). Patient was not masked. Precautions:   Patient currently on None  Patient currently roomed with door closed. Is someone accompanying this pt? no    Is the patient using any DME equipment during [address]? no    Depression Screening:  PHQ-9 Questionaire 10/6/2022 2022 2022   Little interest or pleasure in doing things 0 0 0   Feeling down, depressed, or hopeless 0 0 0   PHQ-9 Total Score 0 0 0        Learning Assessment:  No question data found. Abuse Screening: AMB Abuse Screening 3/30/2023   Do you ever feel afraid of your partner? N   Are you in a relationship with someone who physically or mentally threatens you? N   Is it safe for you to go home? Y          Fall Risk  Fall Risk 3/30/2023   2 or more falls in past year? no   Fall with injury in past year? no         Pt currently taking Anticoagulant therapy? yes  Pt currently taking Antiplatelet therapy? no    Coordination of Care:  1. Have you been to the ER, urgent care clinic since your last visit? Hospitalized since your last visit? No    2. Have you seen or consulted any other health care providers outside of the [address] [place] [address] since your last visit? Include any pap smears or colon screening. no      Please see Red banners under Allergies and Med Rec to remove outside inquires.  All correct information has been verified with patient and added to
prolapse    Echo in 04/2022 showed likely severe mitral regurgitation in the setting of prolapse. DARREL 05/2022, Normal EF, mod-severe MR, prolapse and flail leaflet. S/p MVR with mechanical mitral valve, PVI plus TANJA ligation at [facility] in 09/8/22  ECHO 03/2023, EF 58%, prostheti valve gradient 5 mm Hg, No MR  Continue with Coumadin. INR yesterday was 2.6.      atrial fibrillation: Diagnosed in March 2020. On exam, he appears to be in sinus rhythm. Continue Propranolol   S/p PVI plus TANJA ligation along with MVR with mechanical valve in 09/8/22   Currently on Coumadin. Goal INR should be between 2.5 and 3.5. This plan was discussed with patient who is in agreement. Thank you for allowing me to participate in patient care. Please feel free to call me if you have any question or concern. Gerson Delgado MD  Please note: This document has been produced using voice recognition software. Unrecognized errors in transcription may be present.

## 2023-03-30 NOTE — PATIENT INSTRUCTIONS
New Location Address- projected for the month of May 2023    [address] [facility] [address], [place] [address]

## 2023-04-03 ENCOUNTER — ANTI-COAG VISIT (OUTPATIENT)
Age: 38
End: 2023-04-03
Payer: OTHER GOVERNMENT

## 2023-04-03 DIAGNOSIS — I34.1 NONRHEUMATIC MITRAL (VALVE) PROLAPSE: ICD-10-CM

## 2023-04-03 DIAGNOSIS — I48.0 PAF (PAROXYSMAL ATRIAL FIBRILLATION) (HCC): Primary | ICD-10-CM

## 2023-04-03 LAB
POC INR: 2.7
PROTHROMBIN TIME, POC: 31.8

## 2023-04-03 PROCEDURE — 85610 PROTHROMBIN TIME: CPT | Performed by: INTERNAL MEDICINE

## 2023-04-03 NOTE — PROGRESS NOTES
The INR is stable and therapeutic.    Verbal order with read back Dr Margarito Perea   Monday,  Wednesday and Friday coumadin 6 mg and 4 mg all other days  Return on 1 week

## 2023-04-17 ENCOUNTER — HOSPITAL ENCOUNTER (OUTPATIENT)
Facility: HOSPITAL | Age: 38
Setting detail: SPECIMEN
Discharge: HOME | End: 2023-04-20

## 2023-04-17 DIAGNOSIS — I48.0 PAF (PAROXYSMAL ATRIAL FIBRILLATION) (HCC): ICD-10-CM

## 2023-04-17 LAB — SENTARA SPECIMEN COLLECTION: NORMAL

## 2023-04-17 PROCEDURE — 99001 SPECIMEN HANDLING PT-LAB: CPT

## 2023-04-19 ENCOUNTER — ANTI-COAG VISIT (OUTPATIENT)
Age: 38
End: 2023-04-19

## 2023-04-19 DIAGNOSIS — I48.0 PAF (PAROXYSMAL ATRIAL FIBRILLATION) (HCC): Primary | ICD-10-CM

## 2023-04-19 LAB — INR BLD: 1.9

## 2023-04-19 NOTE — PROGRESS NOTES
Verbal order with read back Dr Maria Alejandra Sloan  Take coumadin 6 mg daily until Sunday then resume Monday,  Wednesday and Friday coumadin 6 mg and 4 mg all other days  Return on Monday

## 2023-04-19 NOTE — PATIENT INSTRUCTIONS
Verbal order with read back Dr Lowry Doing  Take coumadin 6 mg daily until Sunday then resume Monday,  Wednesday and Friday coumadin 6 mg and 4 mg all other days  Return on Monday      Pt advised via [email or web address].

## 2023-04-24 ENCOUNTER — HOSPITAL ENCOUNTER (OUTPATIENT)
Facility: HOSPITAL | Age: 38
Setting detail: SPECIMEN
Discharge: HOME | End: 2023-04-27
Payer: OTHER GOVERNMENT

## 2023-04-24 DIAGNOSIS — I48.0 PAF (PAROXYSMAL ATRIAL FIBRILLATION) (HCC): ICD-10-CM

## 2023-04-24 LAB
INR PPP: 2.1 (ref 0.8–1.2)
PROTHROMBIN TIME: 24.2 SEC (ref 11.5–15.2)

## 2023-04-24 PROCEDURE — 85610 PROTHROMBIN TIME: CPT

## 2023-04-24 PROCEDURE — 36415 COLL VENOUS BLD VENIPUNCTURE: CPT

## 2023-04-26 ENCOUNTER — ANTI-COAG VISIT (OUTPATIENT)
Age: 38
End: 2023-04-26

## 2023-04-26 DIAGNOSIS — I48.0 PAF (PAROXYSMAL ATRIAL FIBRILLATION) (HCC): Primary | ICD-10-CM

## 2023-04-26 NOTE — PATIENT INSTRUCTIONS
Verbal order with read back Dr Luz Elena Bran  Take coumadin 6 mg daily until Saturday then resume Monday,  Wednesday and Friday coumadin 6 mg and 4 mg all other days  Return on Monday    Contacted pt at  number. Two patient Identifiers confirmed. Advised pt per Dr Luz Elena Bran. Pt verbalized understanding.

## 2023-04-26 NOTE — PROGRESS NOTES
Verbal order with read back Dr Franco Couch  Take coumadin 6 mg daily until Saturday then resume Monday,  Wednesday and Friday coumadin 6 mg and 4 mg all other days  Return on Monday

## 2023-05-01 ENCOUNTER — HOSPITAL ENCOUNTER (OUTPATIENT)
Facility: HOSPITAL | Age: 38
Setting detail: SPECIMEN
Discharge: HOME | End: 2023-05-04
Payer: OTHER GOVERNMENT

## 2023-05-01 DIAGNOSIS — I48.0 PAF (PAROXYSMAL ATRIAL FIBRILLATION) (HCC): ICD-10-CM

## 2023-05-01 LAB
INR PPP: 2.7 (ref 0.8–1.2)
PROTHROMBIN TIME: 29.4 SEC (ref 11.5–15.2)

## 2023-05-01 PROCEDURE — 85610 PROTHROMBIN TIME: CPT

## 2023-05-01 PROCEDURE — 36415 COLL VENOUS BLD VENIPUNCTURE: CPT

## 2023-05-03 ENCOUNTER — ANTI-COAG VISIT (OUTPATIENT)
Age: 38
End: 2023-05-03

## 2023-05-03 DIAGNOSIS — I48.0 PAROXYSMAL ATRIAL FIBRILLATION (HCC): Primary | ICD-10-CM

## 2023-05-03 DIAGNOSIS — I48.0 PAF (PAROXYSMAL ATRIAL FIBRILLATION) (HCC): ICD-10-CM

## 2023-05-03 NOTE — PATIENT INSTRUCTIONS
Verbal order with read back Dr Titi Poon  Take  Monday,  Wednesday and Friday coumadin 6 mg and 4 mg all other days  Return on Monday

## 2023-05-03 NOTE — PROGRESS NOTES
Verbal order with read back Dr Nora Butcher  Take  Monday,  Wednesday and Friday coumadin 6 mg and 4 mg all other days  Return on Monday

## 2023-05-08 ENCOUNTER — HOSPITAL ENCOUNTER (OUTPATIENT)
Facility: HOSPITAL | Age: 38
Setting detail: SPECIMEN
Discharge: HOME | End: 2023-05-11
Payer: OTHER GOVERNMENT

## 2023-05-08 DIAGNOSIS — I48.0 PAF (PAROXYSMAL ATRIAL FIBRILLATION) (HCC): ICD-10-CM

## 2023-05-08 LAB
INR PPP: 2.2 (ref 0.8–1.2)
PROTHROMBIN TIME: 25 SEC (ref 11.5–15.2)

## 2023-05-08 PROCEDURE — 36415 COLL VENOUS BLD VENIPUNCTURE: CPT

## 2023-05-08 PROCEDURE — 85610 PROTHROMBIN TIME: CPT

## 2023-05-19 ENCOUNTER — ANTI-COAG VISIT (OUTPATIENT)
Age: 38
End: 2023-05-19
Payer: OTHER GOVERNMENT

## 2023-05-19 DIAGNOSIS — I48.0 PAROXYSMAL ATRIAL FIBRILLATION (HCC): Primary | ICD-10-CM

## 2023-05-19 LAB
POC INR: 3.5
PROTHROMBIN TIME, POC: 41.5

## 2023-05-19 PROCEDURE — 85610 PROTHROMBIN TIME: CPT | Performed by: INTERNAL MEDICINE

## 2023-05-19 NOTE — PATIENT INSTRUCTIONS
Verbal order with read back Dr Madai Peres  Take  Monday,  Wednesday and Friday coumadin 6 mg and 4 mg all other days  Return on Friday

## 2023-05-19 NOTE — PROGRESS NOTES
Verbal order with read back Dr Willis Sinclair  Take  Monday,  Wednesday and Friday coumadin 6 mg and 4 mg all other days  Return on Friday

## 2023-06-02 ENCOUNTER — ANTI-COAG VISIT (OUTPATIENT)
Age: 38
End: 2023-06-02
Payer: OTHER GOVERNMENT

## 2023-06-02 DIAGNOSIS — I48.0 PAROXYSMAL ATRIAL FIBRILLATION (HCC): Primary | ICD-10-CM

## 2023-06-02 LAB
POC INR: 2.8
PROTHROMBIN TIME, POC: NORMAL

## 2023-06-02 PROCEDURE — 85610 PROTHROMBIN TIME: CPT | Performed by: INTERNAL MEDICINE

## 2023-06-02 RX ORDER — WARFARIN SODIUM 4 MG/1
4 TABLET ORAL DAILY
Qty: 90 TABLET | Refills: 3 | Status: SHIPPED | OUTPATIENT
Start: 2023-06-02

## 2023-06-02 NOTE — TELEPHONE ENCOUNTER
PCP: No primary care provider on file.     Last appt:  3/30/2023   Future Appointments   Date Time Provider Shan Dubose   10/3/2023  3:45 PM Felix Brownlee MD [facility] AMB       Requested Prescriptions     Pending Prescriptions Disp Refills    warfarin (COUMADIN) 4 MG tablet 90 tablet 3     Sig: Take 1 tablet by mouth daily

## 2023-06-02 NOTE — PATIENT INSTRUCTIONS
Verbal order with read back Dr Collette Stain  Coumadin 6 mg on  Monday,  Wednesday and Friday and 4 mg all other days  Return 2 weeks

## 2023-06-02 NOTE — PROGRESS NOTES
Verbal order with read back Dr Eugenia Lopez  Coumadin 6 mg on  Monday,  Wednesday and Friday and 4 mg all other days  Return 2 weeks

## 2023-06-16 ENCOUNTER — ANTI-COAG VISIT (OUTPATIENT)
Age: 38
End: 2023-06-16
Payer: COMMERCIAL

## 2023-06-16 DIAGNOSIS — I48.0 PAROXYSMAL ATRIAL FIBRILLATION (HCC): Primary | ICD-10-CM

## 2023-06-16 LAB
POC INR: 3.2
PROTHROMBIN TIME, POC: NORMAL

## 2023-06-16 PROCEDURE — 85610 PROTHROMBIN TIME: CPT | Performed by: INTERNAL MEDICINE

## 2023-06-22 DIAGNOSIS — I48.0 PAROXYSMAL ATRIAL FIBRILLATION (HCC): Primary | ICD-10-CM

## 2023-06-26 ENCOUNTER — HOSPITAL ENCOUNTER (OUTPATIENT)
Facility: HOSPITAL | Age: 38
Setting detail: SPECIMEN
Discharge: HOME | End: 2023-06-29

## 2023-06-26 LAB — SENTARA SPECIMEN COLLECTION: NORMAL

## 2023-06-27 LAB
INR PPP: 2.3 (ref 0.9–1.2)
PROTHROMBIN TIME: 23 SEC (ref 9.1–12)

## 2023-06-27 RX ORDER — WARFARIN SODIUM 4 MG/1
TABLET ORAL
Qty: 90 TABLET | OUTPATIENT
Start: 2023-06-27

## 2023-07-03 NOTE — ROUTINE PROCESS
Cath holding summary     Patient escorted to cath holding from waiting area ambulatory, alert and oriented x 4, voicing no complaints.  Changed into gown and placed on monitor.   NPO since [place].  Lab results, med rec and H&P reviewed on chart.  PIV x 2 inserted without difficulty.   No

## 2023-07-05 ENCOUNTER — ANTI-COAG VISIT (OUTPATIENT)
Age: 38
End: 2023-07-05

## 2023-07-05 NOTE — PROGRESS NOTES
Verbal order with read back Dr Audrey He  Take coumadin 6 mg x 4 days then resume Coumadin 6 mg on  Monday,  Wednesday and Friday and 4 mg all other days  Return 2 weeks

## 2023-07-05 NOTE — PATIENT INSTRUCTIONS
Verbal order with read back Dr Valentina Soni  Take coumadin 6 mg x 4 days then resume Coumadin 6 mg on  Monday,  Wednesday and Friday and 4 mg all other days  Return 2 weeks

## 2023-07-10 ENCOUNTER — ANTI-COAG VISIT (OUTPATIENT)
Age: 38
End: 2023-07-10
Payer: OTHER GOVERNMENT

## 2023-07-10 DIAGNOSIS — I48.0 PAROXYSMAL ATRIAL FIBRILLATION (HCC): Primary | ICD-10-CM

## 2023-07-10 LAB
POC INR: 2.7
PROTHROMBIN TIME, POC: NORMAL

## 2023-07-10 PROCEDURE — 85610 PROTHROMBIN TIME: CPT | Performed by: INTERNAL MEDICINE

## 2023-07-10 NOTE — PATIENT INSTRUCTIONS
Verbal order with read back Dr Miriam Leon   Coumadin 6 mg on  Monday,  Wednesday and Friday and 4 mg all other days  Return 2 weeks

## 2023-07-10 NOTE — PROGRESS NOTES
Verbal order with read back Dr Mckeon Hallmark   Coumadin 6 mg on  Monday,  Wednesday and Friday and 4 mg all other days  Return 2 weeks

## 2023-07-24 ENCOUNTER — ANTI-COAG VISIT (OUTPATIENT)
Age: 38
End: 2023-07-24
Payer: OTHER GOVERNMENT

## 2023-07-24 DIAGNOSIS — I48.20 CHRONIC ATRIAL FIBRILLATION (HCC): Primary | ICD-10-CM

## 2023-07-24 LAB
POC INR: 3
PROTHROMBIN TIME, POC: NORMAL

## 2023-07-24 PROCEDURE — 85610 PROTHROMBIN TIME: CPT | Performed by: INTERNAL MEDICINE

## 2023-07-24 NOTE — PATIENT INSTRUCTIONS
Verbal order with read back Dr Toño Clark   Coumadin 6 mg on  Monday,  Wednesday and Friday and 4 mg all other days  Return 2 weeks

## 2023-07-24 NOTE — PROGRESS NOTES
Verbal order with read back Dr Nela Wade   Coumadin 6 mg on  Monday,  Wednesday and Friday and 4 mg all other days  Return 2 weeks

## 2023-08-07 ENCOUNTER — ANTI-COAG VISIT (OUTPATIENT)
Age: 38
End: 2023-08-07
Payer: COMMERCIAL

## 2023-08-07 DIAGNOSIS — I48.20 CHRONIC ATRIAL FIBRILLATION (HCC): Primary | ICD-10-CM

## 2023-08-07 LAB
POC INR: 1.6
PROTHROMBIN TIME, POC: NORMAL

## 2023-08-07 PROCEDURE — 85610 PROTHROMBIN TIME: CPT | Performed by: INTERNAL MEDICINE

## 2023-08-07 NOTE — PATIENT INSTRUCTIONS
Verbal order with read back Dr Toño Clark   Take coumadin 6 mg Monday through Friday this week then resume coumadin 6 mg on  Monday,  Wednesday and Friday and 4 mg all other days  Return 1 week

## 2023-08-07 NOTE — PROGRESS NOTES
Verbal order with read back Dr Karlee Cruz   Take coumadin 6 mg Monday through Friday this week then resume coumadin 6 mg on  Monday,  Wednesday and Friday and 4 mg all other days  Return 1 week

## 2023-08-10 RX ORDER — PROPRANOLOL HYDROCHLORIDE 20 MG/1
TABLET ORAL
Qty: 180 TABLET | Refills: 3 | Status: SHIPPED | OUTPATIENT
Start: 2023-08-10

## 2023-08-14 ENCOUNTER — ANTI-COAG VISIT (OUTPATIENT)
Age: 38
End: 2023-08-14
Payer: OTHER GOVERNMENT

## 2023-08-14 DIAGNOSIS — I48.20 CHRONIC ATRIAL FIBRILLATION (HCC): Primary | ICD-10-CM

## 2023-08-14 LAB
POC INR: 1.8
PROTHROMBIN TIME, POC: NORMAL

## 2023-08-14 PROCEDURE — 85610 PROTHROMBIN TIME: CPT | Performed by: INTERNAL MEDICINE

## 2023-08-14 NOTE — PROGRESS NOTES
Verbal order with read back Dr Josefina Domingo   Take coumadin  8 mg today and tomorrow then resume coumadin 6 mg on  Monday,  Wednesday and Friday and 4 mg all other days  Return 1 week

## 2023-08-14 NOTE — PATIENT INSTRUCTIONS
Verbal order with read back Dr Tiffanie Keita   Take coumadin  8 mg today and tomorrow then resume coumadin 6 mg on  Monday,  Wednesday and Friday and 4 mg all other days  Return 1 week

## 2023-08-21 ENCOUNTER — ANTI-COAG VISIT (OUTPATIENT)
Age: 38
End: 2023-08-21
Payer: OTHER GOVERNMENT

## 2023-08-21 DIAGNOSIS — I48.20 CHRONIC ATRIAL FIBRILLATION (HCC): Primary | ICD-10-CM

## 2023-08-21 DIAGNOSIS — I34.0 NONRHEUMATIC MITRAL (VALVE) INSUFFICIENCY: ICD-10-CM

## 2023-08-21 LAB
POC INR: 2.1
PROTHROMBIN TIME, POC: NORMAL

## 2023-08-21 PROCEDURE — 85610 PROTHROMBIN TIME: CPT | Performed by: INTERNAL MEDICINE

## 2023-08-21 NOTE — PATIENT INSTRUCTIONS
Verbal order with read back Dr Glo Ford   Take coumadin  6 mg Monday through Friday then resume coumadin 6 mg on  Monday,  Wednesday and Friday and 4 mg all other days  Return 1 week

## 2023-08-28 ENCOUNTER — ANTI-COAG VISIT (OUTPATIENT)
Age: 38
End: 2023-08-28
Payer: OTHER GOVERNMENT

## 2023-08-28 DIAGNOSIS — I48.20 CHRONIC ATRIAL FIBRILLATION (HCC): Primary | ICD-10-CM

## 2023-08-28 DIAGNOSIS — I34.0 NONRHEUMATIC MITRAL (VALVE) INSUFFICIENCY: ICD-10-CM

## 2023-08-28 LAB
POC INR: 2.4
PROTHROMBIN TIME, POC: NORMAL

## 2023-08-28 PROCEDURE — 85610 PROTHROMBIN TIME: CPT | Performed by: INTERNAL MEDICINE

## 2023-08-28 NOTE — PROGRESS NOTES
Verbal order with read back Dr Aries Carrasco   Take coumadin  6 mg Monday through Friday then resume coumadin 6 mg on  Monday,  Wednesday and Friday and 4 mg all other days  Return 1 week

## 2023-08-28 NOTE — PATIENT INSTRUCTIONS
Verbal order with read back Dr Mckeon Hallmark   Take coumadin  6 mg Monday through Friday then resume coumadin 6 mg on  Monday,  Wednesday and Friday and 4 mg all other days  Return 1 week

## 2023-09-08 ENCOUNTER — ANTI-COAG VISIT (OUTPATIENT)
Age: 38
End: 2023-09-08

## 2023-09-08 DIAGNOSIS — I48.20 CHRONIC ATRIAL FIBRILLATION (HCC): Primary | ICD-10-CM

## 2023-09-08 LAB
POC INR: 2
PROTHROMBIN TIME, POC: NORMAL

## 2023-09-08 NOTE — PATIENT INSTRUCTIONS
Verbal order with read back Dr. Bonifacio Salas  Take coumadin  6 mg  Friday, Saturday and Sunday then resume coumadin 6 mg on  Monday,  Wednesday and Friday and 4 mg all other days  Return 1 week

## 2023-09-08 NOTE — PROGRESS NOTES
Verbal order with read back Dr. Angel Anne  Take coumadin  6 mg  Friday, Saturday and Sunday then resume coumadin 6 mg on  Monday,  Wednesday and Friday and 4 mg all other days  Return 1 week

## 2023-09-15 ENCOUNTER — ANTI-COAG VISIT (OUTPATIENT)
Age: 38
End: 2023-09-15

## 2023-09-15 DIAGNOSIS — I48.20 CHRONIC ATRIAL FIBRILLATION (HCC): Primary | ICD-10-CM

## 2023-09-15 LAB
POC INR: 1.8
PROTHROMBIN TIME, POC: NORMAL

## 2023-09-15 NOTE — PATIENT INSTRUCTIONS
Verbal order with read back Dr. Niranjan Rodriguez  Take coumadin  8 mg  Friday, then coumadin 6 mg on Saturday and Sunday Monday and Tuesday and then resume coumadin 6 mg on Monday, Wednesday and Friday and 4 mg all other days  Return 1 week

## 2023-09-25 ENCOUNTER — ANTI-COAG VISIT (OUTPATIENT)
Age: 38
End: 2023-09-25
Payer: OTHER GOVERNMENT

## 2023-09-25 DIAGNOSIS — I48.20 CHRONIC ATRIAL FIBRILLATION (HCC): Primary | ICD-10-CM

## 2023-09-25 LAB
POC INR: 2.4
PROTHROMBIN TIME, POC: NORMAL

## 2023-09-25 PROCEDURE — 85610 PROTHROMBIN TIME: CPT | Performed by: INTERNAL MEDICINE

## 2023-09-25 NOTE — PROGRESS NOTES
Verbal order with read back Dr. Meir Navarro Schedule :   Take coumadin 4 mg on Sun, Thurs and Saturday and coumadin 6 mg all other days  Return 1 week

## 2023-09-25 NOTE — PATIENT INSTRUCTIONS
Verbal order with read back Dr. Ju Sanabria Schedule :   Take coumadin 4 mg on Sun, Thurs and Saturday and coumadin 6 mg all other days  Return 1 week

## 2023-10-02 ENCOUNTER — ANTI-COAG VISIT (OUTPATIENT)
Age: 38
End: 2023-10-02
Payer: COMMERCIAL

## 2023-10-02 DIAGNOSIS — I48.20 CHRONIC ATRIAL FIBRILLATION (HCC): Primary | ICD-10-CM

## 2023-10-02 LAB
POC INR: 1.8
PROTHROMBIN TIME, POC: NORMAL

## 2023-10-02 PROCEDURE — 85610 PROTHROMBIN TIME: CPT | Performed by: INTERNAL MEDICINE

## 2023-10-02 NOTE — PATIENT INSTRUCTIONS
Verbal order with read back Dr. Toney Kirkpatrick  Take coumadin 8 mg tab today then coumadin 6 mg tab Tuesday through Saturday resume  coumadin 4 mg on Sun, Thurs and Saturday and coumadin 6 mg all other days  Recheck in 1 week

## 2023-10-02 NOTE — PROGRESS NOTES
Verbal order with read back Dr. Sosa Brochure  Take coumadin 8 mg tab today then coumadin 6 mg tab Tuesday through Saturday resume  coumadin 4 mg on Sun, Thurs and Saturday and coumadin 6 mg all other days  Recheck in 1 week

## 2023-10-09 ENCOUNTER — ANTI-COAG VISIT (OUTPATIENT)
Age: 38
End: 2023-10-09
Payer: OTHER GOVERNMENT

## 2023-10-09 DIAGNOSIS — I48.20 CHRONIC ATRIAL FIBRILLATION (HCC): Primary | ICD-10-CM

## 2023-10-09 LAB
POC INR: 2.5
PROTHROMBIN TIME, POC: NORMAL

## 2023-10-09 PROCEDURE — 85610 PROTHROMBIN TIME: CPT | Performed by: INTERNAL MEDICINE

## 2023-10-16 ENCOUNTER — NURSE ONLY (OUTPATIENT)
Age: 38
End: 2023-10-16
Payer: COMMERCIAL

## 2023-10-16 DIAGNOSIS — I48.20 CHRONIC ATRIAL FIBRILLATION (HCC): Primary | ICD-10-CM

## 2023-10-16 LAB
POC INR: 2.2
PROTHROMBIN TIME, POC: NORMAL

## 2023-10-16 PROCEDURE — 85610 PROTHROMBIN TIME: CPT | Performed by: INTERNAL MEDICINE

## 2023-10-16 NOTE — PATIENT INSTRUCTIONS
Verbal order with read back Dr. Deanna James  Take coumadin 8 mg tab today then start new schedule   New schedule  coumadin 4 mg on Sun, and Saturday and coumadin 6 mg all other days  Recheck in 1 week Clofazimine Counseling:  I discussed with the patient the risks of clofazimine including but not limited to skin and eye pigmentation, liver damage, nausea/vomiting, gastrointestinal bleeding and allergy.

## 2023-10-16 NOTE — PROGRESS NOTES
Verbal order with read back Dr. Farhana Aguila  Take coumadin 8 mg tab today then start new schedule   New schedule  coumadin 4 mg on Sun, and Saturday and coumadin 6 mg all other days  Recheck in 1 week

## 2023-10-23 ENCOUNTER — ANTI-COAG VISIT (OUTPATIENT)
Age: 38
End: 2023-10-23
Payer: OTHER GOVERNMENT

## 2023-10-23 DIAGNOSIS — I48.20 CHRONIC ATRIAL FIBRILLATION (HCC): Primary | ICD-10-CM

## 2023-10-23 LAB
POC INR: 2.3
PROTHROMBIN TIME, POC: NORMAL

## 2023-10-23 PROCEDURE — 85610 PROTHROMBIN TIME: CPT | Performed by: INTERNAL MEDICINE

## 2023-10-23 NOTE — PROGRESS NOTES
Verbal order with read back Dr. CARLTON Duff  Take coumadin 6 mg Monday through Sunday then resume coumadin 4 mg on Sun, and Saturday and coumadin 6 mg all other days  Recheck in 1 week  Monitor vit K foods

## 2023-10-30 ENCOUNTER — ANTI-COAG VISIT (OUTPATIENT)
Age: 38
End: 2023-10-30
Payer: COMMERCIAL

## 2023-10-30 DIAGNOSIS — I48.20 CHRONIC ATRIAL FIBRILLATION (HCC): Primary | ICD-10-CM

## 2023-10-30 LAB
POC INR: 2.2
PROTHROMBIN TIME, POC: NORMAL

## 2023-10-30 PROCEDURE — 85610 PROTHROMBIN TIME: CPT | Performed by: INTERNAL MEDICINE

## 2023-10-30 NOTE — PATIENT INSTRUCTIONS
Verbal order with read back Dr. Mandeep Jurado  Take coumadin 6 mg Monday through Sunday then resume coumadin 4 mg on Sun, and Saturday and coumadin 6 mg all other days  Recheck in 1 week

## 2023-10-30 NOTE — PROGRESS NOTES
Verbal order with read back Dr. Alonzo Degree  Take coumadin 6 mg Monday through Sunday then resume coumadin 4 mg on Sun, and Saturday and coumadin 6 mg all other days  Recheck in 1 week

## 2023-11-03 ENCOUNTER — OFFICE VISIT (OUTPATIENT)
Age: 38
End: 2023-11-03
Payer: COMMERCIAL

## 2023-11-03 VITALS
WEIGHT: 256 LBS | BODY MASS INDEX: 34.67 KG/M2 | HEART RATE: 94 BPM | OXYGEN SATURATION: 97 % | DIASTOLIC BLOOD PRESSURE: 70 MMHG | SYSTOLIC BLOOD PRESSURE: 104 MMHG | HEIGHT: 72 IN

## 2023-11-03 DIAGNOSIS — I48.20 CHRONIC ATRIAL FIBRILLATION (HCC): ICD-10-CM

## 2023-11-03 DIAGNOSIS — I48.0 PAF (PAROXYSMAL ATRIAL FIBRILLATION) (HCC): Primary | ICD-10-CM

## 2023-11-03 DIAGNOSIS — I34.0 NONRHEUMATIC MITRAL (VALVE) INSUFFICIENCY: ICD-10-CM

## 2023-11-03 DIAGNOSIS — Z95.2 MITRAL VALVE REPLACED: ICD-10-CM

## 2023-11-03 DIAGNOSIS — I48.0 PAROXYSMAL ATRIAL FIBRILLATION (HCC): ICD-10-CM

## 2023-11-03 PROCEDURE — 99214 OFFICE O/P EST MOD 30 MIN: CPT | Performed by: INTERNAL MEDICINE

## 2023-11-03 NOTE — PROGRESS NOTES
[facility]    Surya Denny is 45 y.o. male with a history of mitral valve prolapse, MR s/p mechanical mitral valve replacement in 09/2022  and atrial fibrillation  s/p PVI and TANJA closure in 09/2022      Patient is here today for follow-up appointment for his mitral valve prolapse, MR, mitral valve replacement   Patient has known history of mitral valve prolapse with significant mitral regurgitation which eventually required mitral valve replacement with mechanical mitral valve along with PVI plus TANJA ligation at [facility] in 09/8/22. Patient has remained on anticoagulation with Coumadin     Denies any resting or exertional chest pain or chest pressure to suggest angina or any dyspnea to suggest heart failure. No presyncope or syncope  Denies any PND or LE edema. Taking all medications regularly. Past Medical History:   Diagnosis Date    Fetal alcohol syndrome     H/O prosthetic mitral valve 09/2022    25/33 mm On X mechanical valve    Hx of cardiac cath 08/2022    No evidence of LAD    Mitral regurgitation     MVP (mitral valve prolapse)     PAF (paroxysmal atrial fibrillation) ([facility])     S/P PVI and TANJA ligation along with MVR 09/2022       Review of Systems:  Cardiac symptoms as noted above in HPI. All others negative. Current Outpatient Medications   Medication Sig    propranolol (INDERAL) 20 MG tablet take 1 tablet by mouth twice a day    warfarin (COUMADIN) 4 MG tablet Take 1 tablet by mouth daily    spironolactone (ALDACTONE) 25 MG tablet Take 25 mg by mouth daily     No current facility-administered medications for this visit.        Past Surgical History:   Procedure Laterality Date    TONSILLECTOMY AND ADENOIDECTOMY         Allergies and Sensitivities:  Allergies   Allergen Reactions    Chlorpheniramine-Phenylephrine      Other reaction(s): Unknown (comments)    Codeine      Other reaction(s): Unknown

## 2023-11-06 ENCOUNTER — ANTI-COAG VISIT (OUTPATIENT)
Age: 38
End: 2023-11-06
Payer: COMMERCIAL

## 2023-11-06 DIAGNOSIS — I48.0 PAF (PAROXYSMAL ATRIAL FIBRILLATION) (HCC): Primary | ICD-10-CM

## 2023-11-06 LAB
POC INR: 2.9
PROTHROMBIN TIME, POC: NORMAL

## 2023-11-06 PROCEDURE — 85610 PROTHROMBIN TIME: CPT | Performed by: INTERNAL MEDICINE

## 2023-11-20 ENCOUNTER — ANTI-COAG VISIT (OUTPATIENT)
Age: 38
End: 2023-11-20
Payer: OTHER GOVERNMENT

## 2023-11-20 DIAGNOSIS — I48.20 CHRONIC ATRIAL FIBRILLATION (HCC): Primary | ICD-10-CM

## 2023-11-20 LAB
INTERNATIONAL NORMALIZATION RATIO, POC: 2.4
POC INR: NORMAL
PROTHROMBIN TIME, POC: 2.4

## 2023-11-20 PROCEDURE — 85610 PROTHROMBIN TIME: CPT | Performed by: INTERNAL MEDICINE

## 2023-11-27 ENCOUNTER — ANTI-COAG VISIT (OUTPATIENT)
Age: 38
End: 2023-11-27
Payer: OTHER GOVERNMENT

## 2023-11-27 DIAGNOSIS — I48.20 CHRONIC ATRIAL FIBRILLATION (HCC): Primary | ICD-10-CM

## 2023-11-27 LAB
POC INR: 2.2
PROTHROMBIN TIME, POC: NORMAL

## 2023-11-27 PROCEDURE — 85610 PROTHROMBIN TIME: CPT | Performed by: INTERNAL MEDICINE

## 2023-11-27 NOTE — PATIENT INSTRUCTIONS
Verbal order with read back Dr. Bonifacio Salas   Coumadin 8 mg today and tomorrow then resume coumadin 4 mg on Sun, and Saturday and coumadin 6 mg all other days  Recheck in 1 week

## 2023-11-27 NOTE — PROGRESS NOTES
Verbal order with read back Dr. Juliana Le   Coumadin 8 mg today and tomorrow then resume coumadin 4 mg on Sun, and Saturday and coumadin 6 mg all other days  Recheck in 1 week    A full discussion of the nature of anticoagulants has been carried out. A benefit risk analysis has been presented to the patient, so that they understand the justification for choosing anticoagulation at this time. The need for frequent and regular monitoring, precise dosage adjustment and compliance is stressed. Side effects of potential bleeding are discussed. The patient should avoid any OTC items containing aspirin or ibuprofen, and should avoid great swings in general diet. Avoid alcohol consumption. Call if any signs of abnormal bleeding.

## 2023-12-04 ENCOUNTER — ANTI-COAG VISIT (OUTPATIENT)
Age: 38
End: 2023-12-04
Payer: COMMERCIAL

## 2023-12-04 DIAGNOSIS — I48.20 CHRONIC ATRIAL FIBRILLATION (HCC): Primary | ICD-10-CM

## 2023-12-04 LAB
INTERNATIONAL NORMALIZATION RATIO, POC: 2.5
POC INR: NORMAL
PROTHROMBIN TIME, POC: NORMAL

## 2023-12-04 PROCEDURE — 85610 PROTHROMBIN TIME: CPT | Performed by: INTERNAL MEDICINE

## 2023-12-04 NOTE — PROGRESS NOTES
Verbal order with read back Dr. Juana Acosta  coumadin 4 mg on Sun, and Saturday and coumadin 6 mg all other days  Recheck in 2  week    A full discussion of the nature of anticoagulants has been carried out. A benefit risk analysis has been presented to the patient, so that they understand the justification for choosing anticoagulation at this time. The need for frequent and regular monitoring, precise dosage adjustment and compliance is stressed. Side effects of potential bleeding are discussed. The patient should avoid any OTC items containing aspirin or ibuprofen, and should avoid great swings in general diet. Avoid alcohol consumption.

## 2023-12-04 NOTE — PATIENT INSTRUCTIONS
Verbal order with read back Dr. Olvera Speak  coumadin 4 mg on Sun, and Saturday and coumadin 6 mg all other days  Recheck in 2  week

## 2023-12-26 ENCOUNTER — TELEPHONE (OUTPATIENT)
Age: 38
End: 2023-12-26

## 2023-12-26 NOTE — TELEPHONE ENCOUNTER
Incoming from patient. Two patient identifiers confirmed. Patient requesting medication refill.     PCP: None, None  Last appt:  11/3/2023   Future Appointments   Date Time Provider [address]   12/29/2023  9:00 AM CAG NURSE [facility] BS AMB   11/4/2024  2:45 PM Francisca Mayes MD [facility] BS AMB       Medication requested: warfarin    Pharmacy: [address]

## 2023-12-27 RX ORDER — WARFARIN SODIUM 4 MG/1
4 TABLET ORAL DAILY
Qty: 90 TABLET | Refills: 3 | Status: SHIPPED | OUTPATIENT
Start: 2023-12-27

## 2023-12-27 NOTE — TELEPHONE ENCOUNTER
PCP: None, None    Last appt:  11/3/2023   Future Appointments   Date Time Provider [address]   12/29/2023  9:00 AM CAG NURSE [facility] BS AMB   11/4/2024  2:45 PM Sandee Stanley MD [facility] BS AMB       Requested Prescriptions     Pending Prescriptions Disp Refills    warfarin (COUMADIN) 4 MG tablet 90 tablet 3     Sig: Take 1 tablet by mouth daily

## 2023-12-29 ENCOUNTER — ANTI-COAG VISIT (OUTPATIENT)
Age: 38
End: 2023-12-29
Payer: OTHER GOVERNMENT

## 2023-12-29 DIAGNOSIS — I48.0 PAF (PAROXYSMAL ATRIAL FIBRILLATION) (HCC): Primary | ICD-10-CM

## 2023-12-29 LAB
POC INR: 3.2
PROTHROMBIN TIME, POC: NORMAL

## 2023-12-29 PROCEDURE — 85610 PROTHROMBIN TIME: CPT | Performed by: INTERNAL MEDICINE

## 2023-12-29 NOTE — PATIENT INSTRUCTIONS
Verbal order with read back Dr. CARLTON Duff   coumadin 4 mg on Saturday and Sunday and coumadin 6 mg all other days  Recheck in 2  week

## 2023-12-29 NOTE — PROGRESS NOTES
Verbal order with read back Dr. CARLTON Duff   coumadin 4 mg on Saturday and Sunday and coumadin 6 mg all other days  Recheck in 2  week    A full discussion of the nature of anticoagulants has been carried out.  A benefit risk analysis has been presented to the patient, so that they understand the justification for choosing anticoagulation at this time. The need for frequent and regular monitoring, precise dosage adjustment and compliance is stressed.  Side effects of potential bleeding are discussed.  The patient should avoid any OTC items containing aspirin or ibuprofen, and should avoid great swings in general diet.  Avoid alcohol consumption.

## 2024-01-08 ENCOUNTER — TELEPHONE (OUTPATIENT)
Age: 39
End: 2024-01-08

## 2024-01-08 NOTE — TELEPHONE ENCOUNTER
[facility] called and asked about the continuation of care documentation for Mr. Rios.  They said they haven't received anything yet and the fax number to send it to will be [phone]

## 2024-01-12 ENCOUNTER — ANTI-COAG VISIT (OUTPATIENT)
Age: 39
End: 2024-01-12
Payer: OTHER GOVERNMENT

## 2024-01-12 DIAGNOSIS — I48.0 PAF (PAROXYSMAL ATRIAL FIBRILLATION) (HCC): Primary | ICD-10-CM

## 2024-01-12 LAB
POC INR: 2.5
PROTHROMBIN TIME, POC: NORMAL

## 2024-01-12 PROCEDURE — 85610 PROTHROMBIN TIME: CPT | Performed by: INTERNAL MEDICINE

## 2024-01-12 NOTE — PATIENT INSTRUCTIONS
Verbal order with read back Dr. CARLTON Duff   coumadin 4 mg on Sunday and coumadin 6 mg all other days  Recheck in 2  week

## 2024-01-12 NOTE — PROGRESS NOTES
Verbal order with read back Dr. CARLTON Duff   coumadin 4 mg on Sunday and coumadin 6 mg all other days  Recheck in 2  week    A full discussion of the nature of anticoagulants has been carried out.  A benefit risk analysis has been presented to the patient, so that they understand the justification for choosing anticoagulation at this time. The need for frequent and regular monitoring, precise dosage adjustment and compliance is stressed.  Side effects of potential bleeding are discussed.  The patient should avoid any OTC items containing aspirin or ibuprofen, and should avoid great swings in general diet.  Avoid alcohol consumption.

## 2024-01-26 ENCOUNTER — ANTI-COAG VISIT (OUTPATIENT)
Age: 39
End: 2024-01-26
Payer: OTHER GOVERNMENT

## 2024-01-26 DIAGNOSIS — I48.0 PAROXYSMAL ATRIAL FIBRILLATION (HCC): Primary | ICD-10-CM

## 2024-01-26 LAB
POC INR: 3.2
PROTHROMBIN TIME, POC: NORMAL

## 2024-01-26 PROCEDURE — 85610 PROTHROMBIN TIME: CPT | Performed by: INTERNAL MEDICINE

## 2024-01-26 NOTE — PROGRESS NOTES
The INR is stable and therapeutic. Continue same dose of coumadin and recheck in 2 weeks.    Verbal order with read back Dr. CARLTON Duff   coumadin 4 mg on Sunday and coumadin 6 mg all other days  Recheck in 2  week

## 2024-02-09 ENCOUNTER — ANTI-COAG VISIT (OUTPATIENT)
Age: 39
End: 2024-02-09

## 2024-02-09 DIAGNOSIS — I48.0 PAROXYSMAL ATRIAL FIBRILLATION (HCC): Primary | ICD-10-CM

## 2024-02-09 LAB
POC INR: 4.3
PROTHROMBIN TIME, POC: NORMAL

## 2024-02-09 NOTE — PROGRESS NOTES
Verbal order with read back Dr. CARLTON Duff   coumadin 4 mg on Sunday and coumadin 6 mg all other days  Pt state he has stopped taking meds and will eat a salad this pm   Recheck in 1  week    A full discussion of the nature of anticoagulants has been carried out.  A benefit risk analysis has been presented to the patient, so that they understand the justification for choosing anticoagulation at this time. The need for frequent and regular monitoring, precise dosage adjustment and compliance is stressed.  Side effects of potential bleeding are discussed.  The patient should avoid any OTC items containing aspirin or ibuprofen, and should avoid great swings in general diet.  Avoid alcohol consumption.

## 2024-02-09 NOTE — PATIENT INSTRUCTIONS
Verbal order with read back Dr. CARLTON Duff   coumadin 4 mg on Sunday and coumadin 6 mg all other days  Pt state he has stopped taking meds and will eat a salad this pm   Recheck in 1  week

## 2024-02-16 ENCOUNTER — ANTI-COAG VISIT (OUTPATIENT)
Age: 39
End: 2024-02-16
Payer: OTHER GOVERNMENT

## 2024-02-16 DIAGNOSIS — I48.0 PAROXYSMAL ATRIAL FIBRILLATION (HCC): Primary | ICD-10-CM

## 2024-02-16 LAB
POC INR: 4.8
PROTHROMBIN TIME, POC: NORMAL

## 2024-02-16 PROCEDURE — 85610 PROTHROMBIN TIME: CPT | Performed by: INTERNAL MEDICINE

## 2024-02-16 NOTE — PATIENT INSTRUCTIONS
Verbal order with read back Dr. CARLTON Duff  Hold today then resume coumadin 4 mg on Sunday and coumadin 6 mg all other days  Recheck in 1  week

## 2024-02-16 NOTE — PROGRESS NOTES
Verbal order with read back Dr. CARLTON Duff  Hold today then resume coumadin 4 mg on Sunday and coumadin 6 mg all other days  Recheck in 1  week    A full discussion of the nature of anticoagulants has been carried out.  A benefit risk analysis has been presented to the patient, so that they understand the justification for choosing anticoagulation at this time. The need for frequent and regular monitoring, precise dosage adjustment and compliance is stressed.  Side effects of potential bleeding are discussed.  The patient should avoid any OTC items containing aspirin or ibuprofen, and should avoid great swings in general diet.  Avoid alcohol consumption.

## 2024-02-23 ENCOUNTER — ANTI-COAG VISIT (OUTPATIENT)
Age: 39
End: 2024-02-23
Payer: COMMERCIAL

## 2024-02-23 DIAGNOSIS — I48.0 PAROXYSMAL ATRIAL FIBRILLATION (HCC): Primary | ICD-10-CM

## 2024-02-23 LAB
POC INR: 2.8
PROTHROMBIN TIME, POC: NORMAL

## 2024-02-23 PROCEDURE — 85610 PROTHROMBIN TIME: CPT | Performed by: INTERNAL MEDICINE

## 2024-03-08 ENCOUNTER — ANTI-COAG VISIT (OUTPATIENT)
Age: 39
End: 2024-03-08
Payer: OTHER GOVERNMENT

## 2024-03-08 DIAGNOSIS — I48.0 PAROXYSMAL ATRIAL FIBRILLATION (HCC): Primary | ICD-10-CM

## 2024-03-08 LAB
POC INR: 3.8
PROTHROMBIN TIME, POC: NORMAL

## 2024-03-08 PROCEDURE — 85610 PROTHROMBIN TIME: CPT | Performed by: INTERNAL MEDICINE

## 2024-03-08 NOTE — PATIENT INSTRUCTIONS
Verbal order with read back Dr. CARLTON Duff  Hold today then resume coumadin 4 mg on Sunday and coumadin 6 mg all other days  Recheck in 2  week    A full discussion of the nature of anticoagulants has been carried out.  A benefit risk analysis has been presented to the patient, so that they understand the justification for choosing anticoagulation at this time. The need for frequent and regular monitoring, precise dosage adjustment and compliance is stressed.  Side effects of potential bleeding are discussed.  The patient should avoid any OTC items containing aspirin or ibuprofen, and should avoid great swings in general diet.  Avoid alcohol consumption.  Call if any signs of abnormal bleeding.

## 2024-03-08 NOTE — PROGRESS NOTES
Verbal order with read back Dr. CARLTON Duff  Hold today then resume coumadin 4 mg on Sunday and coumadin 6 mg all other days  Recheck in 2  week

## 2024-03-22 ENCOUNTER — ANTI-COAG VISIT (OUTPATIENT)
Age: 39
End: 2024-03-22
Payer: COMMERCIAL

## 2024-03-22 DIAGNOSIS — I48.0 PAROXYSMAL ATRIAL FIBRILLATION (HCC): Primary | ICD-10-CM

## 2024-03-22 DIAGNOSIS — I48.0 PAF (PAROXYSMAL ATRIAL FIBRILLATION) (HCC): ICD-10-CM

## 2024-03-22 LAB
POC INR: 3.3
PROTHROMBIN TIME, POC: NORMAL

## 2024-03-22 PROCEDURE — 85610 PROTHROMBIN TIME: CPT | Performed by: INTERNAL MEDICINE

## 2024-04-08 ENCOUNTER — ANTI-COAG VISIT (OUTPATIENT)
Age: 39
End: 2024-04-08
Payer: COMMERCIAL

## 2024-04-08 DIAGNOSIS — I10 ESSENTIAL (PRIMARY) HYPERTENSION: Primary | ICD-10-CM

## 2024-04-08 LAB
INR, POC: 3.6 %
VALID INTERNAL CONTROL, POC: 3.6

## 2024-04-08 PROCEDURE — 85610 PROTHROMBIN TIME: CPT | Performed by: INTERNAL MEDICINE

## 2024-04-08 NOTE — PATIENT INSTRUCTIONS
Verbal order with read back Dr. CARLTON Duff  Hold coumadin today then resume coumadin 4 mg on Sunday, Sunday and coumadin 6 mg all other days  Recheck in 2  week          A full discussion of the nature of anticoagulants has been carried out.  A benefit risk analysis has been presented to the patient, so that they understand the justification for choosing anticoagulation at this time. The need for frequent and regular monitoring, precise dosage adjustment and compliance is stressed.  Side effects of potential bleeding are discussed.  The patient should avoid any OTC items containing aspirin or ibuprofen, and should avoid great swings in general diet.  Avoid alcohol consumption.

## 2024-04-22 ENCOUNTER — NURSE ONLY (OUTPATIENT)
Age: 39
End: 2024-04-22
Payer: COMMERCIAL

## 2024-04-22 DIAGNOSIS — Z79.01 ANTICOAGULATED: Primary | ICD-10-CM

## 2024-04-22 LAB
INR, POC: 3 %
VALID INTERNAL CONTROL, POC: YES

## 2024-04-22 PROCEDURE — 85610 PROTHROMBIN TIME: CPT | Performed by: INTERNAL MEDICINE

## 2024-04-22 RX ORDER — CETIRIZINE HYDROCHLORIDE 10 MG/1
TABLET ORAL
COMMUNITY
Start: 2024-02-21

## 2024-04-22 RX ORDER — FLUTICASONE PROPIONATE 50 MCG
SPRAY, SUSPENSION (ML) NASAL
COMMUNITY
Start: 2024-02-22

## 2024-04-22 NOTE — PATIENT INSTRUCTIONS
Verbal order with read back Dr. CARLTON Duff  Hold coumadin today then resume coumadin 4 mg on Sunday, Sunday and coumadin 6 mg all other days  Recheck in 2  week

## 2024-04-22 NOTE — PROGRESS NOTES
Verbal order with read back Dr. CARLTON Duff  Hold coumadin today then resume coumadin 4 mg on Sunday, Sunday and coumadin 6 mg all other days  Recheck in 2  week        A full discussion of the nature of anticoagulants has been carried out.  A benefit risk analysis has been presented to the patient, so that they understand the justification for choosing anticoagulation at this time. The need for frequent and regular monitoring, precise dosage adjustment and compliance is stressed.  Side effects of potential bleeding are discussed.  The patient should avoid any OTC items containing aspirin or ibuprofen, and should avoid great swings in general diet.

## 2024-05-06 ENCOUNTER — ANTI-COAG VISIT (OUTPATIENT)
Age: 39
End: 2024-05-06
Payer: COMMERCIAL

## 2024-05-06 DIAGNOSIS — Z79.01 ANTICOAGULATED: Primary | ICD-10-CM

## 2024-05-06 LAB
INR, POC: 3.4 %
VALID INTERNAL CONTROL, POC: YES

## 2024-05-06 PROCEDURE — 85610 PROTHROMBIN TIME: CPT | Performed by: INTERNAL MEDICINE

## 2024-05-06 NOTE — PATIENT INSTRUCTIONS
Verbal order with read back Dr. CARLTON Duff  Hold coumadin today then resume coumadin 4 mg on Sunday, Sunday and coumadin 6 mg all other days  Recheck in 2  week    Verbal order with read back Dr. CARLTON Duff  Hold coumadin today then resume coumadin 4 mg on Sunday, Sunday and coumadin 6 mg all other days  Recheck in 2  week    A full discussion of the nature of anticoagulants has been carried out.  A benefit risk analysis has been presented to the patient, so that they understand the justification for choosing anticoagulation at this time. The need for frequent and regular monitoring, precise dosage adjustment and compliance is stressed.  Side effects of potential bleeding are discussed.  The patient should avoid any OTC items containing aspirin or ibuprofen, and should avoid great swings in general diet.

## 2024-05-20 ENCOUNTER — NURSE ONLY (OUTPATIENT)
Age: 39
End: 2024-05-20
Payer: COMMERCIAL

## 2024-05-20 DIAGNOSIS — Z79.01 ANTICOAGULATED: Primary | ICD-10-CM

## 2024-05-20 DIAGNOSIS — I48.0 PAROXYSMAL ATRIAL FIBRILLATION (HCC): ICD-10-CM

## 2024-05-20 LAB
POC INR: 3
PROTHROMBIN TIME, POC: NORMAL

## 2024-05-20 PROCEDURE — 85610 PROTHROMBIN TIME: CPT | Performed by: INTERNAL MEDICINE

## 2024-05-20 NOTE — PATIENT INSTRUCTIONS
Verbal order with read back Dr. CARLTON Duff   coumadin 4 mg on Sunday, Sunday and coumadin 6 mg all other days  Recheck in 2  week    A full discussion of the nature of anticoagulants has been carried out.  A benefit risk analysis has been presented to the patient, so that they understand the justification for choosing anticoagulation at this time. The need for frequent and regular monitoring, precise dosage adjustment and compliance is stressed.  Side effects of potential bleeding are discussed.  The patient should avoid any OTC items containing aspirin or ibuprofen, and should avoid great swings in general diet.  Avoid alcohol consumption.

## 2024-06-03 ENCOUNTER — HOSPITAL ENCOUNTER (OUTPATIENT)
Facility: HOSPITAL | Age: 39
Setting detail: SPECIMEN
Discharge: HOME | End: 2024-06-06
Payer: OTHER GOVERNMENT

## 2024-06-03 DIAGNOSIS — I48.0 PAROXYSMAL ATRIAL FIBRILLATION (HCC): ICD-10-CM

## 2024-06-03 DIAGNOSIS — Z79.01 ANTICOAGULATED: Primary | ICD-10-CM

## 2024-06-03 DIAGNOSIS — Z79.01 ANTICOAGULATED: ICD-10-CM

## 2024-06-03 LAB
INR PPP: 2.4 (ref 0.9–1.1)
PROTHROMBIN TIME: 26.3 SEC (ref 11.9–14.7)

## 2024-06-03 PROCEDURE — 85610 PROTHROMBIN TIME: CPT

## 2024-06-03 PROCEDURE — 36415 COLL VENOUS BLD VENIPUNCTURE: CPT

## 2024-06-17 ENCOUNTER — ANTI-COAG VISIT (OUTPATIENT)
Age: 39
End: 2024-06-17
Payer: COMMERCIAL

## 2024-06-17 DIAGNOSIS — I48.0 PAROXYSMAL ATRIAL FIBRILLATION (HCC): ICD-10-CM

## 2024-06-17 DIAGNOSIS — Z79.01 ANTICOAGULATED: Primary | ICD-10-CM

## 2024-06-17 LAB
POC INR: 2.6
PROTHROMBIN TIME, POC: NORMAL

## 2024-06-17 PROCEDURE — 85610 PROTHROMBIN TIME: CPT | Performed by: INTERNAL MEDICINE

## 2024-06-17 NOTE — PROGRESS NOTES
Verbal order with read back Dr. CARLTON Duff   coumadin 4 mg on Sunday, Sunday and coumadin 6 mg all other days  Recheck in 3 week      A full discussion of the nature of anticoagulants has been carried out.  A benefit risk analysis has been presented to the patient, so that they understand the justification for choosing anticoagulation at this time. The need for frequent and regular monitoring, precise dosage adjustment and compliance is stressed.  Side effects of potential bleeding are discussed.  The patient should avoid any OTC items containing aspirin or ibuprofen, and should avoid great swings in general diet.  Avoid alcohol consumption.

## 2024-07-08 ENCOUNTER — NURSE ONLY (OUTPATIENT)
Age: 39
End: 2024-07-08
Payer: COMMERCIAL

## 2024-07-08 DIAGNOSIS — I48.0 PAROXYSMAL ATRIAL FIBRILLATION (HCC): ICD-10-CM

## 2024-07-08 DIAGNOSIS — Z79.01 ANTICOAGULATED: Primary | ICD-10-CM

## 2024-07-08 LAB
POC INR: 2.7
PROTHROMBIN TIME, POC: NORMAL

## 2024-07-08 PROCEDURE — 85610 PROTHROMBIN TIME: CPT | Performed by: INTERNAL MEDICINE

## 2024-07-08 NOTE — PROGRESS NOTES
Verbal order with read back Dr. CARLTON Duff   coumadin 4 mg on Sunday, Sunday and coumadin 6 mg all other days  Recheck in 3 week      A full discussion of the nature of anticoagulants has been carried out.  A benefit risk analysis has been presented to the patient, so that they understand the justification for choosing anticoagulation at this time. The need for frequent and regular monitoring, precise dosage adjustment and compliance is stressed.  Side effects of potential bleeding are discussed.  The patient should avoid any OTC items containing aspirin or ibuprofen, and should avoid great swings in general diet.  Avoid alcohol consumption.  Call if any signs of abnormal bleeding.

## 2024-07-29 ENCOUNTER — ANTI-COAG VISIT (OUTPATIENT)
Age: 39
End: 2024-07-29
Payer: COMMERCIAL

## 2024-07-29 DIAGNOSIS — Z79.01 ANTICOAGULATED: Primary | ICD-10-CM

## 2024-07-29 DIAGNOSIS — I48.0 PAROXYSMAL ATRIAL FIBRILLATION (HCC): ICD-10-CM

## 2024-07-29 LAB
POC INR: 2.7
PROTHROMBIN TIME, POC: NORMAL

## 2024-07-29 PROCEDURE — 85610 PROTHROMBIN TIME: CPT | Performed by: INTERNAL MEDICINE

## 2024-08-19 ENCOUNTER — ANTI-COAG VISIT (OUTPATIENT)
Age: 39
End: 2024-08-19
Payer: COMMERCIAL

## 2024-08-19 DIAGNOSIS — I48.0 PAROXYSMAL ATRIAL FIBRILLATION (HCC): ICD-10-CM

## 2024-08-19 DIAGNOSIS — Z79.01 ANTICOAGULATED: Primary | ICD-10-CM

## 2024-08-19 LAB
POC INR: 3.6
PROTHROMBIN TIME, POC: NORMAL

## 2024-08-19 PROCEDURE — 85610 PROTHROMBIN TIME: CPT | Performed by: INTERNAL MEDICINE

## 2024-09-09 ENCOUNTER — ANTI-COAG VISIT (OUTPATIENT)
Age: 39
End: 2024-09-09
Payer: COMMERCIAL

## 2024-09-09 DIAGNOSIS — I48.0 PAROXYSMAL ATRIAL FIBRILLATION (HCC): ICD-10-CM

## 2024-09-09 DIAGNOSIS — Z79.01 ANTICOAGULATED: Primary | ICD-10-CM

## 2024-09-09 LAB
POC INR: 3.5
PROTHROMBIN TIME, POC: NORMAL

## 2024-09-09 PROCEDURE — 85610 PROTHROMBIN TIME: CPT | Performed by: INTERNAL MEDICINE

## 2024-09-30 ENCOUNTER — ANTI-COAG VISIT (OUTPATIENT)
Age: 39
End: 2024-09-30
Payer: COMMERCIAL

## 2024-09-30 DIAGNOSIS — Z79.01 ANTICOAGULATED: Primary | ICD-10-CM

## 2024-09-30 DIAGNOSIS — I48.0 PAROXYSMAL ATRIAL FIBRILLATION (HCC): ICD-10-CM

## 2024-09-30 LAB
POC INR: 2.2
PROTHROMBIN TIME, POC: NORMAL

## 2024-09-30 PROCEDURE — 85610 PROTHROMBIN TIME: CPT | Performed by: INTERNAL MEDICINE

## 2024-09-30 NOTE — PROGRESS NOTES
Verbal order with read back Dr. CARLTON Duff   Take coumadin 8 mg tab today then resume coumadin 4 mg on Sunday  and coumadin 6 mg all other days  Recheck in 1 week    A full discussion of the nature of anticoagulants has been carried out.  A benefit risk analysis has been presented to the patient, so that they understand the justification for choosing anticoagulation at this time. The need for frequent and regular monitoring, precise dosage adjustment and compliance is stressed.  Side effects of potential bleeding are discussed.  The patient should avoid any OTC items containing aspirin or ibuprofen, and should avoid great swings in general diet.  Avoid alcohol consumption.  Call if any signs of abnormal bleeding.    .

## 2024-10-07 ENCOUNTER — ANTI-COAG VISIT (OUTPATIENT)
Age: 39
End: 2024-10-07
Payer: COMMERCIAL

## 2024-10-07 DIAGNOSIS — I48.0 PAROXYSMAL ATRIAL FIBRILLATION (HCC): ICD-10-CM

## 2024-10-07 DIAGNOSIS — Z79.01 ANTICOAGULATED: Primary | ICD-10-CM

## 2024-10-07 LAB
POC INR: 3.3
PROTHROMBIN TIME, POC: NORMAL

## 2024-10-07 PROCEDURE — 85610 PROTHROMBIN TIME: CPT | Performed by: INTERNAL MEDICINE

## 2024-10-07 NOTE — PROGRESS NOTES
Verbal order with read back Dr. CARLTON Duff  coumadin 4 mg on Sunday  and coumadin 6 mg all other days  Recheck in 3 week    A full discussion of the nature of anticoagulants has been carried out.  A benefit risk analysis has been presented to the patient, so that they understand the justification for choosing anticoagulation at this time. The need for frequent and regular monitoring, precise dosage adjustment and compliance is stressed.  Side effects of potential bleeding are discussed.  The patient should avoid any OTC items containing aspirin or ibuprofen, and should avoid great swings in general diet.  Avoid alcohol consumption.  Call if any signs of abnormal bleeding.

## 2024-10-28 ENCOUNTER — ANTI-COAG VISIT (OUTPATIENT)
Age: 39
End: 2024-10-28
Payer: COMMERCIAL

## 2024-10-28 DIAGNOSIS — I48.0 PAROXYSMAL ATRIAL FIBRILLATION (HCC): ICD-10-CM

## 2024-10-28 DIAGNOSIS — Z79.01 ANTICOAGULATED: Primary | ICD-10-CM

## 2024-10-28 LAB
POC INR: 2.4
PROTHROMBIN TIME, POC: NORMAL

## 2024-10-28 PROCEDURE — 85610 PROTHROMBIN TIME: CPT | Performed by: INTERNAL MEDICINE

## 2024-10-28 NOTE — PROGRESS NOTES
Verbal order with read back Dr. CARLTON Duff  coumadin 4 mg on Sunday  and coumadin 6 mg all other days  Recheck in 3 week    A full discussion of the nature of anticoagulants has been carried out.  A benefit risk analysis has been presented to the patient, so that they understand the justification for choosing anticoagulation at this time. The need for frequent and regular monitoring, precise dosage adjustment and compliance is stressed.  Side effects of potential bleeding are discussed.  The patient should avoid any OTC items containing aspirin or ibuprofen, and should avoid great swings in general diet.  Avoid alcohol consumption.

## 2024-10-29 RX ORDER — WARFARIN SODIUM 4 MG/1
4 TABLET ORAL DAILY
Qty: 90 TABLET | Refills: 3 | Status: SHIPPED | OUTPATIENT
Start: 2024-10-29

## 2024-10-29 NOTE — TELEPHONE ENCOUNTER
PCP: None, None    Last appt:  11/3/2023   Future Appointments   Date Time Provider Department Center   11/18/2024  8:45 AM CAG NURSE [facility] AMB   12/19/2024 10:30 AM Felix Duff MD [facility] AMB       Requested Prescriptions     Pending Prescriptions Disp Refills    warfarin (COUMADIN) 4 MG tablet 90 tablet 3     Sig: Take 1 tablet by mouth daily       Request for a 30 or 90 day supply? Provider Discretion    Pharmacy: confirmed     Other Comments:n/a

## 2024-11-22 ENCOUNTER — ANTI-COAG VISIT (OUTPATIENT)
Age: 39
End: 2024-11-22
Payer: OTHER GOVERNMENT

## 2024-11-22 DIAGNOSIS — Z79.01 ANTICOAGULATED: Primary | ICD-10-CM

## 2024-11-22 DIAGNOSIS — I48.0 PAROXYSMAL ATRIAL FIBRILLATION (HCC): ICD-10-CM

## 2024-11-22 LAB
POC INR: 2.6
PROTHROMBIN TIME, POC: NORMAL

## 2024-11-22 PROCEDURE — 85610 PROTHROMBIN TIME: CPT | Performed by: INTERNAL MEDICINE

## 2024-12-19 ENCOUNTER — OFFICE VISIT (OUTPATIENT)
Age: 39
End: 2024-12-19

## 2024-12-19 VITALS
SYSTOLIC BLOOD PRESSURE: 116 MMHG | BODY MASS INDEX: 32.64 KG/M2 | OXYGEN SATURATION: 97 % | HEIGHT: 72 IN | HEART RATE: 86 BPM | DIASTOLIC BLOOD PRESSURE: 81 MMHG | WEIGHT: 241 LBS

## 2024-12-19 DIAGNOSIS — I34.0 NONRHEUMATIC MITRAL (VALVE) INSUFFICIENCY: ICD-10-CM

## 2024-12-19 DIAGNOSIS — I10 HYPERTENSION, UNSPECIFIED TYPE: Primary | ICD-10-CM

## 2024-12-19 DIAGNOSIS — I05.2 MITRAL STENOSIS WITH INSUFFICIENCY, UNSPECIFIED ETIOLOGY: ICD-10-CM

## 2024-12-19 LAB
POC INR: 3
PROTHROMBIN TIME, POC: NORMAL

## 2024-12-19 ASSESSMENT — PATIENT HEALTH QUESTIONNAIRE - PHQ9
SUM OF ALL RESPONSES TO PHQ9 QUESTIONS 1 & 2: 0
SUM OF ALL RESPONSES TO PHQ QUESTIONS 1-9: 0
2. FEELING DOWN, DEPRESSED OR HOPELESS: NOT AT ALL

## 2024-12-19 NOTE — PATIENT INSTRUCTIONS
Verbal order with read back Dr. CARLTON Duff  coumadin 4 mg on Sunday  and coumadin 6 mg all other days  Recheck in 3 week    A full discussion of the nature of anticoagulants has been carried out.  A benefit risk analysis has been presented to the patient, so that they understand the justification for choosing anticoagulation at this time. The need for frequent and regular monitoring, precise dosage adjustment and compliance is stressed.  Side effects of potential bleeding are discussed.  The patient should avoid any OTC items containing aspirin or ibuprofen, and should avoid great swings in general diet.  Avoid alcohol consumption.     [employer]  PATIENT PREPS:   -Wear easy to remove shirt to your appointment for easier accessibility.    **call office 3-5 days after testing is completed for results** Please ensure testing is completed prior to scheduled follow up appointment. If it is not completed your appointment may be rescheduled**

## 2024-12-19 NOTE — PROGRESS NOTES
Identified pt with two pt identifiers(name and ). Reviewed record in preparation for visit and have obtained necessary documentation.    Santana Rios presents today for   Chief Complaint   Patient presents with    Follow-up     1 YEAR F/U       Pt denies DIZZINESS, SOB, CHEST PAIN/ PRESSURE, FATIGUE/WEAKNESS, HEADACHES, SWELLING.             Santana Rios preferred language for health care discussion is english/other.    Personal Protective Equipment:   Personal Protective Equipment was used including: mask-surgical and hands-gloves. Patient was placed on no precaution(s). Patient was not masked.    Precautions:   Patient currently on None  Patient currently roomed with door closed.    Is someone accompanying this pt? no    Is the patient using any DME equipment during OV? no    Depression Screenin/19/2024    10:29 AM 10/6/2022     2:31 PM 2022    11:27 AM 2022     3:27 PM   PHQ-9 Questionaire   Little interest or pleasure in doing things 0 0 0 0   Feeling down, depressed, or hopeless 0 0 0 0   PHQ-9 Total Score 0 0 0 0        Learning Assessment:  Who is the primary learner? Patient    What is the preferred language for health care of the primary learner? ENGLISH    How does the primary learner prefer to learn new concepts? READING    Answered By pateint    Relationship to Learner SELF        Abuse Screenin/19/2024    10:00 AM 3/30/2023     3:00 PM   AMB Abuse Screening   Do you ever feel afraid of your partner? N N   Are you in a relationship with someone who physically or mentally threatens you? N N   Is it safe for you to go home? Y Y          Fall Risk      2024    10:30 AM 3/30/2023     3:28 PM   Fall Risk   Do you feel unsteady or are you worried about falling?  no no   2 or more falls in past year? no no   Fall with injury in past year? no no         Pt currently taking Anticoagulant /Antiplatelet therapy? Warfarin 4mg     Coordination of Care:  1. 
Verbal order with read back Dr. CARLTON Duff  coumadin 4 mg on Sunday  and coumadin 6 mg all other days  Recheck in 3 week    A full discussion of the nature of anticoagulants has been carried out.  A benefit risk analysis has been presented to the patient, so that they understand the justification for choosing anticoagulation at this time. The need for frequent and regular monitoring, precise dosage adjustment and compliance is stressed.  Side effects of potential bleeding are discussed.  The patient should avoid any OTC items containing aspirin or ibuprofen, and should avoid great swings in general diet.  Avoid alcohol consumption.   
family history.    Social History:  Social History     Tobacco Use    Smoking status: Former     Current packs/day: 0.00     Types: Cigarettes     Start date: 2008     Quit date: 2019     Years since quittin.9    Smokeless tobacco: Never   Substance Use Topics    Alcohol use: Yes    Drug use: Never     He  reports that he quit smoking about 5 years ago. His smoking use included cigarettes. He started smoking about 16 years ago. He has never used smokeless tobacco.  He  reports current alcohol use.    Physical Exam:  BP Readings from Last 3 Encounters:   24 116/81   23 104/70   23 119/61         Pulse Readings from Last 3 Encounters:   24 86   23 94   23 75          Wt Readings from Last 3 Encounters:   24 109.3 kg (241 lb)   23 116.1 kg (256 lb)   23 113.4 kg (250 lb)       Constitutional: Oriented to person, place, and time.   HENT: Head: Normocephalic and atraumatic  Neck: No JVD present.   Cardiovascular: Regular rhythm.   Mitral valve prosthetic valve click appreciated  Lung: Breath sounds normal. No respiratory distress. No ronchi or rales appreciated  Abdominal: No tenderness. No rebound and no guarding.   Musculoskeletal: There is no lower extremity edema. No cynosis      LABS:   @  Lab Results   Component Value Date/Time    WBC 8.4 2022 09:00 AM    HGB 15.8 2022 09:00 AM    HCT 43.9 2022 09:00 AM     2022 09:00 AM    MCV 85.9 2022 09:00 AM     Lab Results   Component Value Date/Time     2022 09:00 AM    K 3.7 2022 09:00 AM     2022 09:00 AM    CO2 25 2022 09:00 AM    BUN 10 2022 09:00 AM    CREATININE 0.92 2022 09:00 AM    GLUCOSE 102 2022 09:00 AM    CALCIUM 9.1 2022 09:00 AM           Latest Ref Rng & Units 2021     2:10 PM   Lipids   ALT 5 - 40 U/L 92    AST 10 - 37 U/L 19      Lab Results   Component Value Date/Time    ALT 92 2021

## 2025-01-06 ENCOUNTER — PATIENT MESSAGE (OUTPATIENT)
Age: 40
End: 2025-01-06

## 2025-01-06 RX ORDER — WARFARIN SODIUM 4 MG/1
4 TABLET ORAL DAILY
Qty: 90 TABLET | Refills: 3 | Status: SHIPPED | OUTPATIENT
Start: 2025-01-06

## 2025-01-06 NOTE — TELEPHONE ENCOUNTER
PCP: None, None    Last appt:  12/19/2024   Future Appointments   Date Time Provider Department Center   1/13/2025  9:00 AM [facility] NURSE [facility] BS AMB   9/23/2025  8[facility]   12/30/2025  3:00 PM Jodi Byrnes PA-C [facility] AMB       Requested Prescriptions     Pending Prescriptions Disp Refills    warfarin (COUMADIN) 4 MG tablet 90 tablet 3     Sig: Take 1 tablet by mouth daily       Request for a 30 or 90 day supply? Provider Discretion    Pharmacy: Confirmed    Other Comments: n/a

## 2025-01-13 ENCOUNTER — NURSE ONLY (OUTPATIENT)
Age: 40
End: 2025-01-13
Payer: COMMERCIAL

## 2025-01-13 DIAGNOSIS — I34.0 NONRHEUMATIC MITRAL (VALVE) INSUFFICIENCY: Primary | ICD-10-CM

## 2025-01-13 LAB
POC INR: 3.8
PROTHROMBIN TIME, POC: NORMAL

## 2025-01-13 PROCEDURE — 85610 PROTHROMBIN TIME: CPT | Performed by: INTERNAL MEDICINE

## 2025-01-13 NOTE — PROGRESS NOTES
Verbal order with read back Felix Duff MD.  Eat a small salad recheck in 2 weeks - 1/27/2025 at 0900      A full discussion of the nature of anticoagulants has been carried out.  A benefit risk analysis has been presented to the patient, so that they understand the justification for choosing anticoagulation at this time. The need for frequent and regular monitoring, precise dosage adjustment and compliance is stressed.  Side effects of potential bleeding are discussed.  The patient should avoid any OTC items containing aspirin or ibuprofen, and should avoid great swings in general diet.  Avoid alcohol consumption.  Call if any signs of abnormal bleeding.

## 2025-01-15 NOTE — PROGRESS NOTES
Verbal order with read back Felix Duff MD.  Eat a small salad recheck in 2 weeks - 1/27/2025 at 0900

## 2025-01-27 ENCOUNTER — NURSE ONLY (OUTPATIENT)
Age: 40
End: 2025-01-27
Payer: COMMERCIAL

## 2025-01-27 DIAGNOSIS — I48.20 CHRONIC ATRIAL FIBRILLATION (HCC): Primary | ICD-10-CM

## 2025-01-27 DIAGNOSIS — I34.0 NONRHEUMATIC MITRAL (VALVE) INSUFFICIENCY: ICD-10-CM

## 2025-01-27 LAB
POC INR: 4
PROTHROMBIN TIME, POC: NORMAL

## 2025-01-27 PROCEDURE — 85610 PROTHROMBIN TIME: CPT | Performed by: INTERNAL MEDICINE

## 2025-01-27 NOTE — PROGRESS NOTES
Verbal order with read back Dr. CARLTON Duff  Eat a small salad  today   Take coumadin 4 mg on Sunday  and coumadin 6 mg all other days  Recheck in 2 week    A full discussion of the nature of anticoagulants has been carried out.  A benefit risk analysis has been presented to the patient, so that they understand the justification for choosing anticoagulation at this time. The need for frequent and regular monitoring, precise dosage adjustment and compliance is stressed.  Side effects of potential bleeding are discussed.  The patient should avoid any OTC items containing aspirin or ibuprofen, and should avoid great swings in general diet.  Avoid alcohol consumption.

## 2025-01-29 ENCOUNTER — PATIENT MESSAGE (OUTPATIENT)
Age: 40
End: 2025-01-29

## 2025-01-29 ENCOUNTER — TELEPHONE (OUTPATIENT)
Age: 40
End: 2025-01-29

## 2025-01-29 NOTE — TELEPHONE ENCOUNTER
Incoming from MD/INR. Two patient Identifiers confirmed. Rep stated that pt needs an auth for home monitor.  He stated that pts need auth through [facility] for monitor even though insurance is [employer]. Reconfirmed insurance. He stated taht they did not have [employer] advantage ID number and he would have them rerun that number through for verification to see if pt could be approved. He stated if auth was still needed he would fax information to office. Fax number [phone] given. Will await response.

## 2025-02-03 NOTE — TELEPHONE ENCOUNTER
Incoming from Tylor KELLY/INR. Two patient Identifiers confirmed. Informed pt still needs auth from [facility] for approval of INR. Need [place] form [place] [id number] and fax to [phone].  Will complete referral.  Pt verbalized understanding.

## 2025-02-03 NOTE — TELEPHONE ENCOUNTER
Attempted to contact Tylor at number, no answer. Lvm for f/u regarding pt. Asked if he could  return call to office at [phone] if PA was needed. Informed him no paperwork was received from office regarding PA on our end.  Will await return call.

## 2025-02-10 ENCOUNTER — NURSE ONLY (OUTPATIENT)
Age: 40
End: 2025-02-10
Payer: COMMERCIAL

## 2025-02-10 DIAGNOSIS — I34.0 NONRHEUMATIC MITRAL (VALVE) INSUFFICIENCY: Primary | ICD-10-CM

## 2025-02-10 LAB
POC INR: 3.2
PROTHROMBIN TIME, POC: NORMAL

## 2025-02-10 PROCEDURE — 85610 PROTHROMBIN TIME: CPT | Performed by: INTERNAL MEDICINE

## 2025-02-10 NOTE — PROGRESS NOTES
Verbal order with read back Dr. CARLTON Duff  Take coumadin 4 mg on Sunday  and coumadin 6 mg all other days  Recheck in 2 week    A full discussion of the nature of anticoagulants has been carried out.  A benefit risk analysis has been presented to the patient, so that they understand the justification for choosing anticoagulation at this time. The need for frequent and regular monitoring, precise dosage adjustment and compliance is stressed.  Side effects of potential bleeding are discussed.  The patient should avoid any OTC items containing aspirin or ibuprofen, and should avoid great swings in general diet.  Avoid alcohol consumption.  Call if any signs of abnormal bleeding.

## 2025-02-10 NOTE — TELEPHONE ENCOUNTER
Pt in office today. Two patient identifiers confirmed.  Informed auth pending for device from [employer]. They will send correspondence once a decision has been made.  Pt verbalized understanding.

## 2025-03-03 ENCOUNTER — NURSE ONLY (OUTPATIENT)
Age: 40
End: 2025-03-03
Payer: COMMERCIAL

## 2025-03-03 DIAGNOSIS — Z95.2 MITRAL VALVE REPLACED: ICD-10-CM

## 2025-03-03 DIAGNOSIS — I34.1 NONRHEUMATIC MITRAL (VALVE) PROLAPSE: Primary | ICD-10-CM

## 2025-03-03 DIAGNOSIS — I48.0 PAF (PAROXYSMAL ATRIAL FIBRILLATION) (HCC): ICD-10-CM

## 2025-03-03 LAB
POC INR: 3.3
PROTHROMBIN TIME, POC: NORMAL

## 2025-03-03 PROCEDURE — 85610 PROTHROMBIN TIME: CPT | Performed by: INTERNAL MEDICINE

## 2025-03-03 NOTE — PROGRESS NOTES
Verbal order with read back Dr. CARLTON Duff  Take coumadin 4 mg on Sunday  and coumadin 6 mg all other days  Recheck in 2 week      A full discussion of the nature of anticoagulants has been carried out.  A benefit risk analysis has been presented to the patient, so that they understand the justification for choosing anticoagulation at this time. The need for frequent and regular monitoring, precise dosage adjustment and compliance is stressed.  Side effects of potential bleeding are discussed.  The patient should avoid any OTC items containing aspirin or ibuprofen, and should avoid great swings in general diet.  Avoid alcohol consumption.  Call if any signs of abnormal bleeding.  Next PT/INR test in 3/17/2025

## 2025-03-12 RX ORDER — WARFARIN SODIUM 4 MG/1
4 TABLET ORAL DAILY
Qty: 90 TABLET | Refills: 3 | Status: SHIPPED | OUTPATIENT
Start: 2025-03-12

## 2025-03-12 NOTE — TELEPHONE ENCOUNTER
PCP: None, None    Last appt:  12/19/2024   Future Appointments   Date Time Provider Department Center   3/17/2025  8:30 AM [facility] AMB   9/23/2025  8[facility]   12/30/2025  3:00 PM Jodi Byrnes PA-C [facility] AMB       Requested Prescriptions     Pending Prescriptions Disp Refills    warfarin (COUMADIN) 4 MG tablet 90 tablet 3     Sig: Take 1 tablet by mouth daily       Request for a 30 or 90 day supply? Provider Discretion    Pharmacy: confirmed    Other Comments: n/a

## 2025-03-14 RX ORDER — WARFARIN SODIUM 4 MG/1
4 TABLET ORAL DAILY
Qty: 90 TABLET | Refills: 3 | OUTPATIENT
Start: 2025-03-14

## 2025-03-14 NOTE — TELEPHONE ENCOUNTER
Patient called to request a refill on the Warfarin. He states it's urgent and needs it today, Verified pharmacy on file is correct.

## 2025-03-17 ENCOUNTER — NURSE ONLY (OUTPATIENT)
Age: 40
End: 2025-03-17
Payer: COMMERCIAL

## 2025-03-17 DIAGNOSIS — I48.0 PAF (PAROXYSMAL ATRIAL FIBRILLATION) (HCC): Primary | ICD-10-CM

## 2025-03-17 LAB
POC INR: 3.9
PROTHROMBIN TIME, POC: NORMAL

## 2025-03-17 PROCEDURE — 85610 PROTHROMBIN TIME: CPT | Performed by: INTERNAL MEDICINE

## 2025-03-17 RX ORDER — WARFARIN SODIUM 6 MG/1
6 TABLET ORAL DAILY
Qty: 30 TABLET | Refills: 5 | Status: SHIPPED | OUTPATIENT
Start: 2025-03-17

## 2025-03-17 NOTE — PROGRESS NOTES
Verbal order with read back Dr. CARLTON Duff  Hold today then resume Take coumadin 4 mg on Sunday  and coumadin 6 mg all other days  Recheck in 2 week    A full discussion of the nature of anticoagulants has been carried out.  A benefit risk analysis has been presented to the patient, so that they understand the justification for choosing anticoagulation at this time. The need for frequent and regular monitoring, precise dosage adjustment and compliance is stressed.  Side effects of potential bleeding are discussed.  The patient should avoid any OTC items containing aspirin or ibuprofen, and should avoid great swings in general diet.  Avoid alcohol consumption.  Call if any signs of abnormal bleeding.

## 2025-03-17 NOTE — PATIENT INSTRUCTIONS
Verbal order with read back Dr. CARLTON Duff  Hold coumadin today then resume coumadin 4 mg on Sunday  and coumadin 6 mg all other days  Recheck in 2 week  A full discussion of the nature of anticoagulants has been carried out.  A benefit risk analysis has been presented to the patient, so that they understand the justification for choosing anticoagulation at this time. The need for frequent and regular monitoring, precise dosage adjustment and compliance is stressed.  Side effects of potential bleeding are discussed.  The patient should avoid any OTC items containing aspirin or ibuprofen, and should avoid great swings in general diet.  Avoid alcohol consumption.  Call if any signs of abnormal bleeding.

## 2025-03-17 NOTE — TELEPHONE ENCOUNTER
PCP: None, None    Last appt:  12/19/2024   Future Appointments   Date Time Provider Department Center   9/23/2025  8:00 AM [facility] 1 [facility] BS AMB   12/30/2025  3:00 PM Jodi Byrnes PA-C [facility] BS AMB       Requested Prescriptions     Pending Prescriptions Disp Refills    warfarin (COUMADIN) 6 MG tablet 30 tablet 5     Sig: Take 1 tablet by mouth daily Per coumadin clinic instructions.       Request for a 30 or 90 day supply? Provider Discretion    Pharmacy: confirmed     Other Comments:n/a

## 2025-03-18 RX ORDER — WARFARIN SODIUM 4 MG/1
4 TABLET ORAL DAILY
Qty: 90 TABLET | Refills: 3 | OUTPATIENT
Start: 2025-03-18

## 2025-03-31 ENCOUNTER — CLINICAL SUPPORT (OUTPATIENT)
Age: 40
End: 2025-03-31
Payer: COMMERCIAL

## 2025-03-31 DIAGNOSIS — I34.1 NONRHEUMATIC MITRAL (VALVE) PROLAPSE: ICD-10-CM

## 2025-03-31 DIAGNOSIS — I48.0 PAF (PAROXYSMAL ATRIAL FIBRILLATION) (HCC): Primary | ICD-10-CM

## 2025-03-31 LAB
POC INR: 2.7
PROTHROMBIN TIME, POC: NORMAL

## 2025-03-31 PROCEDURE — 85610 PROTHROMBIN TIME: CPT | Performed by: INTERNAL MEDICINE

## 2025-03-31 NOTE — PROGRESS NOTES
Verbal order with read back Dr. CARLTON Duff  Take coumadin 4 mg on Sunday  and coumadin 6 mg all other days  Recheck in 3 week    A full discussion of the nature of anticoagulants has been carried out.  A benefit risk analysis has been presented to the patient, so that they understand the justification for choosing anticoagulation at this time. The need for frequent and regular monitoring, precise dosage adjustment and compliance is stressed.  Side effects of potential bleeding are discussed.  The patient should avoid any OTC items containing aspirin or ibuprofen, and should avoid great swings in general diet.  Avoid alcohol consumption.  Call if any signs of abnormal bleeding.

## 2025-04-21 ENCOUNTER — ANTI-COAG VISIT (OUTPATIENT)
Age: 40
End: 2025-04-21

## 2025-04-21 DIAGNOSIS — Z95.2 MITRAL VALVE REPLACED: ICD-10-CM

## 2025-04-21 DIAGNOSIS — I48.0 PAF (PAROXYSMAL ATRIAL FIBRILLATION) (HCC): Primary | ICD-10-CM

## 2025-05-05 ENCOUNTER — ANTI-COAG VISIT (OUTPATIENT)
Age: 40
End: 2025-05-05
Payer: COMMERCIAL

## 2025-05-05 DIAGNOSIS — Z95.2 MITRAL VALVE REPLACED: Primary | ICD-10-CM

## 2025-05-05 DIAGNOSIS — I48.0 PAF (PAROXYSMAL ATRIAL FIBRILLATION) (HCC): ICD-10-CM

## 2025-05-05 LAB
POC INR: 2.8
PROTHROMBIN TIME, POC: NORMAL

## 2025-05-05 PROCEDURE — 85610 PROTHROMBIN TIME: CPT | Performed by: INTERNAL MEDICINE

## 2025-05-05 NOTE — PROGRESS NOTES
Verbal order with read back Dr. CARLTON Duff  Resume coumadin 6 mg daily   Recheck in 2 week    A full discussion of the nature of anticoagulants has been carried out.  A benefit risk analysis has been presented to the patient, so that they understand the justification for choosing anticoagulation at this time. The need for frequent and regular monitoring, precise dosage adjustment and compliance is stressed.  Side effects of potential bleeding are discussed.  The patient should avoid any OTC items containing aspirin or ibuprofen, and should avoid great swings in general diet.  Avoid alcohol consumption.  Call if any signs of abnormal bleeding.  Next PT/INR test in 5/19/2025

## 2025-05-19 ENCOUNTER — ANTI-COAG VISIT (OUTPATIENT)
Age: 40
End: 2025-05-19
Payer: COMMERCIAL

## 2025-05-19 LAB — INTERNATIONAL NORMALIZATION RATIO, POC: 3.3

## 2025-05-19 PROCEDURE — 85610 PROTHROMBIN TIME: CPT | Performed by: INTERNAL MEDICINE

## 2025-05-19 PROCEDURE — 36415 COLL VENOUS BLD VENIPUNCTURE: CPT | Performed by: INTERNAL MEDICINE

## 2025-05-19 NOTE — PROGRESS NOTES
A full discussion of the nature of anticoagulants has been carried out.  A benefit risk analysis has been presented to the patient, so that they understand the justification for choosing anticoagulation at this time. The need for frequent and regular monitoring, precise dosage adjustment and compliance is stressed.  Side effects of potential bleeding are discussed.  The patient should avoid any OTC items containing aspirin or ibuprofen, and should avoid great swings in general diet.  Avoid alcohol consumption.  Call if any signs of abnormal bleeding.  Next PT/INR test in 2 weeks.                                     Verbal order with read back Dr. CARLTON Duff  Resume coumadin 6 mg daily   Recheck in 2 week

## 2025-06-02 ENCOUNTER — ANTI-COAG VISIT (OUTPATIENT)
Age: 40
End: 2025-06-02
Payer: COMMERCIAL

## 2025-06-02 DIAGNOSIS — Z95.2 MITRAL VALVE REPLACED: Primary | ICD-10-CM

## 2025-06-02 LAB
POC INR: 1.9
PROTHROMBIN TIME, POC: NORMAL

## 2025-06-02 PROCEDURE — 85610 PROTHROMBIN TIME: CPT | Performed by: INTERNAL MEDICINE

## 2025-06-02 NOTE — PATIENT INSTRUCTIONS
Verbal order with read back Dr. CARLTON Duff  Take 1.5 tablets today and tomorrow and then resume 6 mg tablet (1 tab) daily.  Return for INR check in 2 weeks

## 2025-06-03 NOTE — PROGRESS NOTES
Mr. Santana Rios is here today for anticoagulation monitoring for the diagnosis of  atrial fib and valve replacement .  His INR goal is 2.5-3.5 and his current Coumadin dose is 6 mg daily except for Sunday..     Today's findings include an INR of 1.9     Considering Mr. Rios's past history, todays findings, and per the coumadin policy/protocol, Mr. Rios was instructed to take Coumadin as follows,  Take 1 1/2 tablet for the next 2 days then return to the 6 mg daily, except Sunday.  He was also instructed to come back in 2 weeks for an INR check.    A full discussion of the nature of anticoagulants has been carried out.  A full discussion of the need for frequent and regular monitoring, precise dosage adjustment and compliance was stressed.  Side effects of potential bleeding were discussed and Mr. Rios was instructed to call [phone] if there are any signs of abnormal bleeding.  Mr. Rios was instructed to avoid any OTC items containing aspirin or ibuprofen and prior to starting any new OTC products to consult with his physician or pharmacist to ensure no drug interactions are present.  Mr. Rios was instructed to avoid any major changes in his general diet and to avoid alcohol consumption.  .      Mr. Rios verbalized his understanding of all instructions and will call the office with any questions, concerns, or signs of abnormal bleeding or blood clot.

## 2025-06-04 ENCOUNTER — RESULTS FOLLOW-UP (OUTPATIENT)
Age: 40
End: 2025-06-04

## 2025-06-16 ENCOUNTER — ANTI-COAG VISIT (OUTPATIENT)
Age: 40
End: 2025-06-16
Payer: COMMERCIAL

## 2025-06-16 DIAGNOSIS — I48.0 PAF (PAROXYSMAL ATRIAL FIBRILLATION) (HCC): ICD-10-CM

## 2025-06-16 DIAGNOSIS — Z95.2 MITRAL VALVE REPLACED: Primary | ICD-10-CM

## 2025-06-16 LAB
POC INR: 2.9
PROTHROMBIN TIME, POC: NORMAL

## 2025-06-16 PROCEDURE — 85610 PROTHROMBIN TIME: CPT | Performed by: INTERNAL MEDICINE

## 2025-06-16 NOTE — PROGRESS NOTES
Verbal order with read back Dr. CARLTON Duff  Coumadin 6 mg tablet (1 tab) daily.  Return for INR check in 3 weeks    A full discussion of the nature of anticoagulants has been carried out.  A benefit risk analysis has been presented to the patient, so that they understand the justification for choosing anticoagulation at this time. The need for frequent and regular monitoring, precise dosage adjustment and compliance is stressed.  Side effects of potential bleeding are discussed.  The patient should avoid any OTC items containing aspirin or ibuprofen, and should avoid great swings in general diet.  Avoid alcohol consumption.  Call if any signs of abnormal bleeding.

## 2025-07-07 ENCOUNTER — ANTI-COAG VISIT (OUTPATIENT)
Age: 40
End: 2025-07-07
Payer: COMMERCIAL

## 2025-07-07 DIAGNOSIS — I48.0 PAF (PAROXYSMAL ATRIAL FIBRILLATION) (HCC): Primary | ICD-10-CM

## 2025-07-07 DIAGNOSIS — Z95.2 MITRAL VALVE REPLACED: ICD-10-CM

## 2025-07-07 LAB
POC INR: 2.2
PROTHROMBIN TIME, POC: NORMAL

## 2025-07-07 PROCEDURE — 85610 PROTHROMBIN TIME: CPT | Performed by: INTERNAL MEDICINE

## 2025-07-07 NOTE — PROGRESS NOTES
Verbal order with read back Dr. CARLTON Duff  Take coumadin 1.5 mg tab today then resume Coumadin 6 mg tablet (1 tab) daily.  Return for INR check in 3 weeks    A full discussion of the nature of anticoagulants has been carried out.  A benefit risk analysis has been presented to the patient, so that they understand the justification for choosing anticoagulation at this time. The need for frequent and regular monitoring, precise dosage adjustment and compliance is stressed.  Side effects of potential bleeding are discussed.  The patient should avoid any OTC items containing aspirin or ibuprofen, and should avoid great swings in general diet.  Avoid alcohol consumption.  Call if any signs of abnormal bleeding.

## 2025-07-07 NOTE — PATIENT INSTRUCTIONS
Verbal order with read back Dr. CARLTON Duff  Take coumadin 1.5 mg tab today then resume Coumadin 6 mg tablet (1 tab) daily.  Return for INR check in 3 weeks

## 2025-08-01 ENCOUNTER — ANTI-COAG VISIT (OUTPATIENT)
Age: 40
End: 2025-08-01
Payer: COMMERCIAL

## 2025-08-01 DIAGNOSIS — I48.0 PAF (PAROXYSMAL ATRIAL FIBRILLATION) (HCC): Primary | ICD-10-CM

## 2025-08-01 LAB
POC INR: 3
PROTHROMBIN TIME, POC: NORMAL

## 2025-08-01 PROCEDURE — 85610 PROTHROMBIN TIME: CPT | Performed by: INTERNAL MEDICINE

## 2025-08-22 ENCOUNTER — CLINICAL SUPPORT (OUTPATIENT)
Age: 40
End: 2025-08-22
Payer: COMMERCIAL

## 2025-08-22 DIAGNOSIS — I48.0 PAF (PAROXYSMAL ATRIAL FIBRILLATION) (HCC): Primary | ICD-10-CM

## 2025-08-22 LAB
POC INR: 3.5
PROTHROMBIN TIME, POC: NORMAL

## 2025-08-22 PROCEDURE — 85610 PROTHROMBIN TIME: CPT | Performed by: INTERNAL MEDICINE

## (undated) DEVICE — SET ANGIO L6.5IN L BOR 3 W STPCOCK SPIK TBNG

## (undated) DEVICE — GUIDEWIRE VASC L260CM DIA0035IN TIP L3MM PTFE J STD TAPR FIX

## (undated) DEVICE — PROCEDURE KIT FLUID MGMT CUST MAINFOLD STRL

## (undated) DEVICE — CATHETER ANGIO 5FR L100CM GRY S STL NYL JR4 3 SEG BRAID L

## (undated) DEVICE — GLIDESHEATH SLENDER STAINLESS STEEL KIT: Brand: GLIDESHEATH SLENDER

## (undated) DEVICE — PRESSURE MONITORING SET: Brand: TRUWAVE

## (undated) DEVICE — BAND COMPR L29CM XLN CLR PLAS HEMSTAT EXT HK AND LOOP RETEN

## (undated) DEVICE — DRAPE,ANGIO,BRACH,STERILE,38X44: Brand: MEDLINE

## (undated) DEVICE — SURGICAL PROCEDURE KIT LT HRT CUST

## (undated) DEVICE — RADIFOCUS OPTITORQUE ANGIOGRAPHIC CATHETER: Brand: OPTITORQUE